# Patient Record
Sex: FEMALE | Race: WHITE | NOT HISPANIC OR LATINO | Employment: OTHER | URBAN - METROPOLITAN AREA
[De-identification: names, ages, dates, MRNs, and addresses within clinical notes are randomized per-mention and may not be internally consistent; named-entity substitution may affect disease eponyms.]

---

## 2017-10-16 ENCOUNTER — OFFICE VISIT (OUTPATIENT)
Dept: RHEUMATOLOGY | Facility: CLINIC | Age: 82
End: 2017-10-16
Payer: MEDICARE

## 2017-10-16 VITALS
WEIGHT: 137 LBS | DIASTOLIC BLOOD PRESSURE: 64 MMHG | HEIGHT: 64 IN | SYSTOLIC BLOOD PRESSURE: 150 MMHG | BODY MASS INDEX: 23.39 KG/M2

## 2017-10-16 DIAGNOSIS — M15.9 OSTEOARTHRITIS, GENERALIZED: ICD-10-CM

## 2017-10-16 DIAGNOSIS — M06.9 RHEUMATOID ARTHRITIS, INVOLVING UNSPECIFIED SITE, UNSPECIFIED RHEUMATOID FACTOR PRESENCE: Primary | ICD-10-CM

## 2017-10-16 PROCEDURE — 99213 OFFICE O/P EST LOW 20 MIN: CPT | Mod: ,,, | Performed by: INTERNAL MEDICINE

## 2017-10-16 RX ORDER — SIMVASTATIN 20 MG/1
20 TABLET, FILM COATED ORAL NIGHTLY
COMMUNITY

## 2017-10-16 RX ORDER — BENZONATATE 100 MG/1
CAPSULE ORAL
Refills: 0 | COMMUNITY
Start: 2017-08-27

## 2017-10-16 RX ORDER — LEVOTHYROXINE SODIUM 88 UG/1
88 TABLET ORAL DAILY
COMMUNITY

## 2017-10-16 RX ORDER — AMLODIPINE BESYLATE 5 MG/1
5 TABLET ORAL DAILY
COMMUNITY

## 2017-10-16 RX ORDER — METOPROLOL SUCCINATE 25 MG/1
25 TABLET, EXTENDED RELEASE ORAL DAILY
Refills: 3 | Status: ON HOLD | COMMUNITY
Start: 2017-09-18 | End: 2020-05-16 | Stop reason: HOSPADM

## 2017-10-16 RX ORDER — ALLOPURINOL 100 MG/1
100 TABLET ORAL DAILY
COMMUNITY

## 2017-10-16 RX ORDER — SILVER SULFADIAZINE 10 G/1000G
CREAM TOPICAL
Refills: 1 | COMMUNITY
Start: 2017-08-25 | End: 2020-05-11

## 2017-10-16 RX ORDER — BENAZEPRIL HYDROCHLORIDE AND HYDROCHLOROTHIAZIDE 20; 12.5 MG/1; MG/1
1 TABLET ORAL 2 TIMES DAILY
Refills: 3 | COMMUNITY
Start: 2017-08-04 | End: 2020-05-11

## 2017-10-16 RX ORDER — PREDNISONE 20 MG/1
TABLET ORAL
Refills: 0 | COMMUNITY
Start: 2017-08-27 | End: 2020-05-11

## 2017-10-16 NOTE — PROGRESS NOTES
[facility] RHEUMATOLOGY            PROGRESS NOTE      Subjective:       Patient ID:   NAME: Jillian Borrego : 3/5/1925     89 y.o. female    Referring Doc: No ref. provider found  Other Physicians:    Chief Complaint:  Rheumatoid Arthritis    History of Present Illness:     Patient returns today for a regularly scheduled follow-up visit for RA      The patient has been off Plaquenil  due to ophthalmologic side effects but has done well without it. No prolonged morning stiffness or joint swelling.  Chest pains cough or shortness of breasome          ROS:   GEN:  No  fever, night sweats . weight is stable   some fatigue  SKIN: no rashes, no bruising, no ulcerations, no Raynaud's  HEENT: no HA's, No visual changes, no mucosal ulcers, no sicca symptoms,  CV:   no CP, SOB, PND, TAVAREZ, no orthopnea, no palpitations  PULM: normal with no SOB, cough, hemoptysis, sputum or pleuritic pain  GI:  no abdominal pain, nausea, vomiting, constipation, diarrhea, melanotic stools, BRBPR, hematemesis, no dysphagia  :   no dysuria  NEURO: no paresthesias, headaches, visual disturbances, muscle weakness  MUSCULOSKELETAL:no joint swelling, prolonged AM stiffness, no back pain, no muscle pain  Allergies:  Review of patient's allergies indicates:   Allergen Reactions    Pcn [penicillins]        Medications:    Current Outpatient Prescriptions:     allopurinol (ZYLOPRIM) 100 MG tablet, Take by mouth., Disp: , Rfl:     amlodipine (NORVASC) 5 MG tablet, Take by mouth., Disp: , Rfl:     BENAZEPRIL HCL (LOTENSIN ORAL), Take by mouth., Disp: , Rfl:     benazepril-hydrochlorthiazide (LOTENSIN HCT) 20-12.5 mg per tablet, Take 1 tablet by mouth 2 (two) times daily., Disp: , Rfl: 3    benzonatate (TESSALON) 100 MG capsule, TAKE 1-2 CAPSULES BY MOUTH EVERY 8 HOURS AS NEEDED FOR COUGH, Disp: , Rfl: 0    FUROSEMIDE (LASIX ORAL), Take by mouth., Disp: , Rfl:     KLOR-CON SPRINKLE 10 mEq CpSR, , Disp: , Rfl:     levothyroxine (LEVOXYL) 88 MCG  "tablet, Take by mouth., Disp: , Rfl:     metoprolol succinate (TOPROL-XL) 25 MG 24 hr tablet, EVERY DAY, Disp: , Rfl: 3    predniSONE (DELTASONE) 20 MG tablet, TAKE 2 TABLETS BY MOUTH ONCE DAILY FOR 5 DAYS, Disp: , Rfl: 0    silver sulfADIAZINE 1% (SILVADENE) 1 % cream, APPLY TO THE AFFECTED AREA THREE TIMES A DAY, Disp: , Rfl: 1    simvastatin (ZOCOR) 20 MG tablet, Take by mouth., Disp: , Rfl:     WARFARIN SODIUM (COUMADIN ORAL), Take 6 mg by mouth. , Disp: , Rfl:     PMHx/PSHx Updates:  Objective:     Vitals:  Blood pressure (!) 150/64, height 5' 4" (1.626 m), weight 62.1 kg (137 lb).    Physical Examination:   GEN: no apparent distress, comfortable; AAOx3  SKIN: no rashes,no ulceration, no Raynaud's, no petechiae, no SQ nodules,  HEAD: normal  EYES: no pallor, no icterus,   NECK: no masses, thyroid normal, trachea midline, no LAD/LN's, supple  CV: RRR with no murmur; l S1 and S2 reg. ,no gallop no rubs,   CHEST: Normal respiratory effort; CTAB; normal breath sounds; no wheeze or crackles  MUSC/Skeletal: ROM normal; no crepitus; joints without synovitis,  no deformities  No joint swelling or tenderness of PIP, MCP, wrist, elbow, shoulder, or knee joints  EXTREM: no clubbing, cyanosis, +1 pitting  edema,normal  pulses   NEURO: grossly intact; motor WNL; AAOx  PSYCH: normal mood, affect and behavior  LYMPH: normal cervical, supraclavicular          Labs:   Lab Results   Component Value Date    WBC 9.18 01/04/2011    HGB 12.1 01/04/2011    HCT 34.8 (L) 01/04/2011    MCV 92.1 01/04/2011     01/04/2011    CMP  @LASTLAB(NA,K,CL,CO2,GLU,BUN,Creatinine,Calcium,PROT,Albumin,Bilitot,Alkphos,AST,ALT,CRP,ESR,RF,CCP,TERRENCE,SSA,CPK,uric acid) )@  I have reviewed all available lab results and radiology reports.    Radiology/Diagnostic Studies:        Assessment/Plan:   (1) 89 y.o. female with diagnosis of Rheumatoid arthritis. She has done well off Plaquenil. She is not on steroids and takes no pain medications.    We " will request results of recent blood test done by her nephrologist. Follow-up in 6 months or before if needed            Discussion:     I have explained all of the above in detail and the patient understands all of the current recommendation(s). I have answered all questions to the best of my ability and to their complete satisfaction.       The patient is to continue with the current management plan         RTC in   6 months      Electronically signed by Anitra Mcdonald MD

## 2018-04-26 ENCOUNTER — OUTSIDE PLACE OF SERVICE (OUTPATIENT)
Dept: ADMINISTRATIVE | Facility: OTHER | Age: 83
End: 2018-04-26
Payer: MEDICARE

## 2018-04-26 PROCEDURE — 99232 SBSQ HOSP IP/OBS MODERATE 35: CPT | Mod: ,,, | Performed by: THORACIC SURGERY (CARDIOTHORACIC VASCULAR SURGERY)

## 2018-04-28 ENCOUNTER — OUTSIDE PLACE OF SERVICE (OUTPATIENT)
Dept: ADMINISTRATIVE | Facility: OTHER | Age: 83
End: 2018-04-28
Payer: MEDICARE

## 2018-04-28 PROCEDURE — 99231 SBSQ HOSP IP/OBS SF/LOW 25: CPT | Mod: ,,, | Performed by: THORACIC SURGERY (CARDIOTHORACIC VASCULAR SURGERY)

## 2018-04-30 ENCOUNTER — OUTSIDE PLACE OF SERVICE (OUTPATIENT)
Dept: ADMINISTRATIVE | Facility: OTHER | Age: 83
End: 2018-04-30
Payer: MEDICARE

## 2018-04-30 PROCEDURE — 37224 PR FEM/POPL REVAS W/TLA: CPT | Mod: RT,,, | Performed by: THORACIC SURGERY (CARDIOTHORACIC VASCULAR SURGERY)

## 2018-04-30 PROCEDURE — 75710 ARTERY X-RAYS ARM/LEG: CPT | Mod: 26,59,, | Performed by: THORACIC SURGERY (CARDIOTHORACIC VASCULAR SURGERY)

## 2018-04-30 PROCEDURE — 99152 MOD SED SAME PHYS/QHP 5/>YRS: CPT | Mod: ,,, | Performed by: THORACIC SURGERY (CARDIOTHORACIC VASCULAR SURGERY)

## 2019-08-13 ENCOUNTER — LAB VISIT (OUTPATIENT)
Dept: LAB | Facility: HOSPITAL | Age: 84
End: 2019-08-13
Attending: INTERNAL MEDICINE
Payer: MEDICARE

## 2019-08-13 DIAGNOSIS — R60.9 EDEMA: ICD-10-CM

## 2019-08-13 DIAGNOSIS — N18.9 ANEMIA OF CHRONIC RENAL FAILURE: ICD-10-CM

## 2019-08-13 DIAGNOSIS — E87.6 HYPOPOTASSEMIA: ICD-10-CM

## 2019-08-13 DIAGNOSIS — E87.3 ALKALOSIS: Primary | ICD-10-CM

## 2019-08-13 DIAGNOSIS — D63.1 ANEMIA OF CHRONIC RENAL FAILURE: ICD-10-CM

## 2019-08-13 DIAGNOSIS — M10.9 GOUT, UNSPECIFIED: ICD-10-CM

## 2019-08-13 DIAGNOSIS — S81.802A WOUND OF LEFT LEG: ICD-10-CM

## 2019-08-13 DIAGNOSIS — N25.81 SECONDARY HYPERPARATHYROIDISM OF RENAL ORIGIN: ICD-10-CM

## 2019-08-13 DIAGNOSIS — I10 ESSENTIAL HYPERTENSION, MALIGNANT: ICD-10-CM

## 2019-08-13 DIAGNOSIS — M06.9 ATROPHIC ARTHRITIS: ICD-10-CM

## 2019-08-13 DIAGNOSIS — I48.91 ATRIAL FIBRILLATION: ICD-10-CM

## 2019-08-13 LAB
ALBUMIN SERPL BCP-MCNC: 3.6 G/DL (ref 3.5–5.2)
ANION GAP SERPL CALC-SCNC: 9 MMOL/L (ref 8–16)
BASOPHILS # BLD AUTO: 0.05 K/UL (ref 0–0.2)
BASOPHILS NFR BLD: 0.7 % (ref 0–1.9)
BILIRUB UR QL STRIP: NEGATIVE
BUN SERPL-MCNC: 32 MG/DL (ref 10–30)
CALCIUM SERPL-MCNC: 9.6 MG/DL (ref 8.7–10.5)
CHLORIDE SERPL-SCNC: 105 MMOL/L (ref 95–110)
CLARITY UR: CLEAR
CO2 SERPL-SCNC: 27 MMOL/L (ref 23–29)
COLOR UR: YELLOW
CREAT SERPL-MCNC: 1.4 MG/DL (ref 0.5–1.4)
CREAT UR-MCNC: 60 MG/DL (ref 15–325)
DIFFERENTIAL METHOD: ABNORMAL
EOSINOPHIL # BLD AUTO: 0.2 K/UL (ref 0–0.5)
EOSINOPHIL NFR BLD: 3.4 % (ref 0–8)
ERYTHROCYTE [DISTWIDTH] IN BLOOD BY AUTOMATED COUNT: 14.8 % (ref 11.5–14.5)
EST. GFR  (AFRICAN AMERICAN): 37.1 ML/MIN/1.73 M^2
EST. GFR  (NON AFRICAN AMERICAN): 32.2 ML/MIN/1.73 M^2
GLUCOSE SERPL-MCNC: 99 MG/DL (ref 70–110)
GLUCOSE UR QL STRIP: NEGATIVE
HCT VFR BLD AUTO: 33.5 % (ref 37–48.5)
HGB BLD-MCNC: 10.7 G/DL (ref 12–16)
HGB UR QL STRIP: NEGATIVE
IMM GRANULOCYTES # BLD AUTO: 0.04 K/UL (ref 0–0.04)
IMM GRANULOCYTES NFR BLD AUTO: 0.6 % (ref 0–0.5)
KETONES UR QL STRIP: NEGATIVE
LEUKOCYTE ESTERASE UR QL STRIP: NEGATIVE
LYMPHOCYTES # BLD AUTO: 0.9 K/UL (ref 1–4.8)
LYMPHOCYTES NFR BLD: 12.7 % (ref 18–48)
MAGNESIUM SERPL-MCNC: 2 MG/DL (ref 1.6–2.6)
MCH RBC QN AUTO: 32.4 PG (ref 27–31)
MCHC RBC AUTO-ENTMCNC: 31.9 G/DL (ref 32–36)
MCV RBC AUTO: 102 FL (ref 82–98)
MONOCYTES # BLD AUTO: 0.5 K/UL (ref 0.3–1)
MONOCYTES NFR BLD: 6.9 % (ref 4–15)
NEUTROPHILS # BLD AUTO: 5.1 K/UL (ref 1.8–7.7)
NEUTROPHILS NFR BLD: 75.7 % (ref 38–73)
NITRITE UR QL STRIP: NEGATIVE
NRBC BLD-RTO: 0 /100 WBC
PH UR STRIP: 7 [PH] (ref 5–8)
PHOSPHATE SERPL-MCNC: 3.6 MG/DL (ref 2.7–4.5)
PHOSPHATE SERPL-MCNC: 3.6 MG/DL (ref 2.7–4.5)
PLATELET # BLD AUTO: 130 K/UL (ref 150–350)
PMV BLD AUTO: 11 FL (ref 9.2–12.9)
POTASSIUM SERPL-SCNC: 4.1 MMOL/L (ref 3.5–5.1)
PROT UR QL STRIP: NEGATIVE
PROT UR-MCNC: 18 MG/DL (ref 0–15)
PROT/CREAT UR: 0.3 MG/G{CREAT} (ref 0–0.2)
PTH-INTACT SERPL-MCNC: 117.2 PG/ML (ref 9–77)
RBC # BLD AUTO: 3.3 M/UL (ref 4–5.4)
SODIUM SERPL-SCNC: 141 MMOL/L (ref 136–145)
SP GR UR STRIP: 1.01 (ref 1–1.03)
URN SPEC COLLECT METH UR: NORMAL
UROBILINOGEN UR STRIP-ACNC: NEGATIVE EU/DL
WBC # BLD AUTO: 6.71 K/UL (ref 3.9–12.7)

## 2019-08-13 PROCEDURE — 85025 COMPLETE CBC W/AUTO DIFF WBC: CPT

## 2019-08-13 PROCEDURE — 83735 ASSAY OF MAGNESIUM: CPT

## 2019-08-13 PROCEDURE — 36415 COLL VENOUS BLD VENIPUNCTURE: CPT

## 2019-08-13 PROCEDURE — 83970 ASSAY OF PARATHORMONE: CPT

## 2019-08-13 PROCEDURE — 80069 RENAL FUNCTION PANEL: CPT

## 2019-08-13 PROCEDURE — 81003 URINALYSIS AUTO W/O SCOPE: CPT

## 2019-08-13 PROCEDURE — 84156 ASSAY OF PROTEIN URINE: CPT

## 2019-09-06 ENCOUNTER — LAB VISIT (OUTPATIENT)
Dept: LAB | Facility: HOSPITAL | Age: 84
End: 2019-09-06
Attending: INTERNAL MEDICINE
Payer: MEDICARE

## 2019-09-06 DIAGNOSIS — I25.10 CORONARY ATHEROSCLEROSIS OF NATIVE CORONARY ARTERY: ICD-10-CM

## 2019-09-06 DIAGNOSIS — I51.9 MYXEDEMA HEART DISEASE: Primary | ICD-10-CM

## 2019-09-06 DIAGNOSIS — E03.9 MYXEDEMA HEART DISEASE: Primary | ICD-10-CM

## 2019-09-06 DIAGNOSIS — I48.0 PAROXYSMAL ATRIAL FIBRILLATION: ICD-10-CM

## 2019-09-06 DIAGNOSIS — I50.9 HEART FAILURE, UNSPECIFIED: ICD-10-CM

## 2019-09-06 LAB
ALBUMIN SERPL BCP-MCNC: 3.8 G/DL (ref 3.5–5.2)
ALP SERPL-CCNC: 60 U/L (ref 55–135)
ALT SERPL W/O P-5'-P-CCNC: 14 U/L (ref 10–44)
ANION GAP SERPL CALC-SCNC: 10 MMOL/L (ref 8–16)
AST SERPL-CCNC: 21 U/L (ref 10–40)
BASOPHILS # BLD AUTO: 0.04 K/UL (ref 0–0.2)
BASOPHILS NFR BLD: 0.7 % (ref 0–1.9)
BILIRUB SERPL-MCNC: 0.9 MG/DL (ref 0.1–1)
BNP SERPL-MCNC: 546 PG/ML (ref 0–99)
BUN SERPL-MCNC: 28 MG/DL (ref 10–30)
CALCIUM SERPL-MCNC: 9.3 MG/DL (ref 8.7–10.5)
CHLORIDE SERPL-SCNC: 105 MMOL/L (ref 95–110)
CO2 SERPL-SCNC: 28 MMOL/L (ref 23–29)
CREAT SERPL-MCNC: 1.1 MG/DL (ref 0.5–1.4)
DIFFERENTIAL METHOD: ABNORMAL
EOSINOPHIL # BLD AUTO: 0.1 K/UL (ref 0–0.5)
EOSINOPHIL NFR BLD: 2 % (ref 0–8)
ERYTHROCYTE [DISTWIDTH] IN BLOOD BY AUTOMATED COUNT: 16.3 % (ref 11.5–14.5)
EST. GFR  (AFRICAN AMERICAN): 49.7 ML/MIN/1.73 M^2
EST. GFR  (NON AFRICAN AMERICAN): 43.1 ML/MIN/1.73 M^2
GLUCOSE SERPL-MCNC: 102 MG/DL (ref 70–110)
HCT VFR BLD AUTO: 31.6 % (ref 37–48.5)
HGB BLD-MCNC: 10.1 G/DL (ref 12–16)
IMM GRANULOCYTES # BLD AUTO: 0.04 K/UL (ref 0–0.04)
IMM GRANULOCYTES NFR BLD AUTO: 0.7 % (ref 0–0.5)
LYMPHOCYTES # BLD AUTO: 0.6 K/UL (ref 1–4.8)
LYMPHOCYTES NFR BLD: 9 % (ref 18–48)
MCH RBC QN AUTO: 33.4 PG (ref 27–31)
MCHC RBC AUTO-ENTMCNC: 32 G/DL (ref 32–36)
MCV RBC AUTO: 105 FL (ref 82–98)
MONOCYTES # BLD AUTO: 0.6 K/UL (ref 0.3–1)
MONOCYTES NFR BLD: 9.4 % (ref 4–15)
NEUTROPHILS # BLD AUTO: 4.8 K/UL (ref 1.8–7.7)
NEUTROPHILS NFR BLD: 78.2 % (ref 38–73)
NRBC BLD-RTO: 0 /100 WBC
PLATELET # BLD AUTO: 92 K/UL (ref 150–350)
PMV BLD AUTO: 11.7 FL (ref 9.2–12.9)
POTASSIUM SERPL-SCNC: 3.8 MMOL/L (ref 3.5–5.1)
PROT SERPL-MCNC: 6.8 G/DL (ref 6–8.4)
RBC # BLD AUTO: 3.02 M/UL (ref 4–5.4)
SODIUM SERPL-SCNC: 143 MMOL/L (ref 136–145)
WBC # BLD AUTO: 6.08 K/UL (ref 3.9–12.7)

## 2019-09-06 PROCEDURE — 80053 COMPREHEN METABOLIC PANEL: CPT

## 2019-09-06 PROCEDURE — 83880 ASSAY OF NATRIURETIC PEPTIDE: CPT

## 2019-09-06 PROCEDURE — 36415 COLL VENOUS BLD VENIPUNCTURE: CPT

## 2019-09-06 PROCEDURE — 85025 COMPLETE CBC W/AUTO DIFF WBC: CPT

## 2019-11-04 ENCOUNTER — LAB VISIT (OUTPATIENT)
Dept: LAB | Facility: HOSPITAL | Age: 84
End: 2019-11-04
Attending: INTERNAL MEDICINE
Payer: MEDICARE

## 2019-11-04 DIAGNOSIS — I48.0 PAROXYSMAL ATRIAL FIBRILLATION: Primary | ICD-10-CM

## 2019-11-04 DIAGNOSIS — Z79.01 LONG TERM (CURRENT) USE OF ANTICOAGULANTS: ICD-10-CM

## 2019-11-04 LAB
INR PPP: 3
PROTHROMBIN TIME: 30.5 SEC (ref 10.6–14.8)

## 2019-11-04 PROCEDURE — 36415 COLL VENOUS BLD VENIPUNCTURE: CPT

## 2019-11-04 PROCEDURE — 85610 PROTHROMBIN TIME: CPT

## 2019-11-08 ENCOUNTER — LAB VISIT (OUTPATIENT)
Dept: LAB | Facility: HOSPITAL | Age: 84
End: 2019-11-08
Attending: INTERNAL MEDICINE
Payer: MEDICARE

## 2019-11-08 DIAGNOSIS — D64.9 ANEMIA, UNSPECIFIED: Primary | ICD-10-CM

## 2019-11-08 DIAGNOSIS — I48.0 PAROXYSMAL ATRIAL FIBRILLATION: ICD-10-CM

## 2019-11-08 DIAGNOSIS — I50.9 HEART FAILURE, UNSPECIFIED: ICD-10-CM

## 2019-11-08 DIAGNOSIS — N18.9 CHRONIC KIDNEY DISEASE, UNSPECIFIED: ICD-10-CM

## 2019-11-08 LAB
ANION GAP SERPL CALC-SCNC: 10 MMOL/L (ref 8–16)
BASOPHILS # BLD AUTO: 0.03 K/UL (ref 0–0.2)
BASOPHILS NFR BLD: 0.5 % (ref 0–1.9)
BNP SERPL-MCNC: 643 PG/ML (ref 0–99)
BUN SERPL-MCNC: 42 MG/DL (ref 10–30)
CALCIUM SERPL-MCNC: 10.1 MG/DL (ref 8.7–10.5)
CHLORIDE SERPL-SCNC: 99 MMOL/L (ref 95–110)
CO2 SERPL-SCNC: 29 MMOL/L (ref 23–29)
CREAT SERPL-MCNC: 1.6 MG/DL (ref 0.5–1.4)
DIFFERENTIAL METHOD: ABNORMAL
EOSINOPHIL # BLD AUTO: 0.2 K/UL (ref 0–0.5)
EOSINOPHIL NFR BLD: 2.8 % (ref 0–8)
ERYTHROCYTE [DISTWIDTH] IN BLOOD BY AUTOMATED COUNT: 14.3 % (ref 11.5–14.5)
EST. GFR  (AFRICAN AMERICAN): 31.6 ML/MIN/1.73 M^2
EST. GFR  (NON AFRICAN AMERICAN): 27.4 ML/MIN/1.73 M^2
GLUCOSE SERPL-MCNC: 94 MG/DL (ref 70–110)
HCT VFR BLD AUTO: 36.6 % (ref 37–48.5)
HGB BLD-MCNC: 11.8 G/DL (ref 12–16)
IMM GRANULOCYTES # BLD AUTO: 0.04 K/UL (ref 0–0.04)
IMM GRANULOCYTES NFR BLD AUTO: 0.6 % (ref 0–0.5)
LYMPHOCYTES # BLD AUTO: 1 K/UL (ref 1–4.8)
LYMPHOCYTES NFR BLD: 14.9 % (ref 18–48)
MCH RBC QN AUTO: 33.1 PG (ref 27–31)
MCHC RBC AUTO-ENTMCNC: 32.2 G/DL (ref 32–36)
MCV RBC AUTO: 103 FL (ref 82–98)
MONOCYTES # BLD AUTO: 0.6 K/UL (ref 0.3–1)
MONOCYTES NFR BLD: 9.7 % (ref 4–15)
NEUTROPHILS # BLD AUTO: 4.7 K/UL (ref 1.8–7.7)
NEUTROPHILS NFR BLD: 71.5 % (ref 38–73)
NRBC BLD-RTO: 0 /100 WBC
PLATELET # BLD AUTO: 76 K/UL (ref 150–350)
PMV BLD AUTO: 12.6 FL (ref 9.2–12.9)
POTASSIUM SERPL-SCNC: 4.1 MMOL/L (ref 3.5–5.1)
RBC # BLD AUTO: 3.57 M/UL (ref 4–5.4)
SODIUM SERPL-SCNC: 138 MMOL/L (ref 136–145)
WBC # BLD AUTO: 6.5 K/UL (ref 3.9–12.7)

## 2019-11-08 PROCEDURE — 85025 COMPLETE CBC W/AUTO DIFF WBC: CPT

## 2019-11-08 PROCEDURE — 36415 COLL VENOUS BLD VENIPUNCTURE: CPT

## 2019-11-08 PROCEDURE — 80048 BASIC METABOLIC PNL TOTAL CA: CPT

## 2019-11-08 PROCEDURE — 83880 ASSAY OF NATRIURETIC PEPTIDE: CPT

## 2019-11-18 ENCOUNTER — LAB VISIT (OUTPATIENT)
Dept: LAB | Facility: HOSPITAL | Age: 84
End: 2019-11-18
Attending: INTERNAL MEDICINE
Payer: MEDICARE

## 2019-11-18 DIAGNOSIS — S81.802A WOUND OF LEFT LEG: ICD-10-CM

## 2019-11-18 DIAGNOSIS — I48.91 ATRIAL FIBRILLATION: ICD-10-CM

## 2019-11-18 DIAGNOSIS — M10.9 GOUT, UNSPECIFIED: ICD-10-CM

## 2019-11-18 DIAGNOSIS — N18.30 CHRONIC KIDNEY DISEASE, STAGE III (MODERATE): ICD-10-CM

## 2019-11-18 DIAGNOSIS — E87.6 HYPOPOTASSEMIA: ICD-10-CM

## 2019-11-18 DIAGNOSIS — M06.9 ATROPHIC ARTHRITIS: ICD-10-CM

## 2019-11-18 DIAGNOSIS — Z79.01 LONG TERM (CURRENT) USE OF ANTICOAGULANTS: ICD-10-CM

## 2019-11-18 DIAGNOSIS — D63.1 ANEMIA OF CHRONIC RENAL FAILURE: ICD-10-CM

## 2019-11-18 DIAGNOSIS — N25.81 SECONDARY HYPERPARATHYROIDISM OF RENAL ORIGIN: ICD-10-CM

## 2019-11-18 DIAGNOSIS — I48.0 PAROXYSMAL ATRIAL FIBRILLATION: ICD-10-CM

## 2019-11-18 DIAGNOSIS — E87.3 ALKALOSIS: Primary | ICD-10-CM

## 2019-11-18 DIAGNOSIS — R60.9 EDEMA: ICD-10-CM

## 2019-11-18 DIAGNOSIS — I10 ESSENTIAL HYPERTENSION, MALIGNANT: ICD-10-CM

## 2019-11-18 DIAGNOSIS — N18.9 ANEMIA OF CHRONIC RENAL FAILURE: ICD-10-CM

## 2019-11-18 LAB
ALBUMIN SERPL BCP-MCNC: 4 G/DL (ref 3.5–5.2)
ANION GAP SERPL CALC-SCNC: 4 MMOL/L (ref 8–16)
BASOPHILS # BLD AUTO: 0.04 K/UL (ref 0–0.2)
BASOPHILS NFR BLD: 0.7 % (ref 0–1.9)
BILIRUB UR QL STRIP: NEGATIVE
BUN SERPL-MCNC: 42 MG/DL (ref 10–30)
CALCIUM SERPL-MCNC: 10 MG/DL (ref 8.7–10.5)
CHLORIDE SERPL-SCNC: 105 MMOL/L (ref 95–110)
CLARITY UR: CLEAR
CO2 SERPL-SCNC: 28 MMOL/L (ref 23–29)
COLOR UR: COLORLESS
CREAT SERPL-MCNC: 1.6 MG/DL (ref 0.5–1.4)
CREAT UR-MCNC: 24 MG/DL (ref 15–325)
DIFFERENTIAL METHOD: ABNORMAL
EOSINOPHIL # BLD AUTO: 0.2 K/UL (ref 0–0.5)
EOSINOPHIL NFR BLD: 3.2 % (ref 0–8)
ERYTHROCYTE [DISTWIDTH] IN BLOOD BY AUTOMATED COUNT: 14.6 % (ref 11.5–14.5)
EST. GFR  (AFRICAN AMERICAN): 31.6 ML/MIN/1.73 M^2
EST. GFR  (NON AFRICAN AMERICAN): 27.4 ML/MIN/1.73 M^2
GLUCOSE SERPL-MCNC: 91 MG/DL (ref 70–110)
GLUCOSE UR QL STRIP: NEGATIVE
HCT VFR BLD AUTO: 34.7 % (ref 37–48.5)
HGB BLD-MCNC: 11.4 G/DL (ref 12–16)
HGB UR QL STRIP: NEGATIVE
IMM GRANULOCYTES # BLD AUTO: 0.03 K/UL (ref 0–0.04)
IMM GRANULOCYTES NFR BLD AUTO: 0.5 % (ref 0–0.5)
INR PPP: 2.9
KETONES UR QL STRIP: NEGATIVE
LEUKOCYTE ESTERASE UR QL STRIP: NEGATIVE
LYMPHOCYTES # BLD AUTO: 0.7 K/UL (ref 1–4.8)
LYMPHOCYTES NFR BLD: 12.7 % (ref 18–48)
MAGNESIUM SERPL-MCNC: 2.2 MG/DL (ref 1.6–2.6)
MCH RBC QN AUTO: 33.2 PG (ref 27–31)
MCHC RBC AUTO-ENTMCNC: 32.9 G/DL (ref 32–36)
MCV RBC AUTO: 101 FL (ref 82–98)
MONOCYTES # BLD AUTO: 0.5 K/UL (ref 0.3–1)
MONOCYTES NFR BLD: 9.2 % (ref 4–15)
NEUTROPHILS # BLD AUTO: 4.2 K/UL (ref 1.8–7.7)
NEUTROPHILS NFR BLD: 73.7 % (ref 38–73)
NITRITE UR QL STRIP: NEGATIVE
NRBC BLD-RTO: 0 /100 WBC
PH UR STRIP: 6 [PH] (ref 5–8)
PHOSPHATE SERPL-MCNC: 3.6 MG/DL (ref 2.7–4.5)
PHOSPHATE SERPL-MCNC: 3.6 MG/DL (ref 2.7–4.5)
PLATELET # BLD AUTO: 79 K/UL (ref 150–350)
PMV BLD AUTO: 11.7 FL (ref 9.2–12.9)
POTASSIUM SERPL-SCNC: 4.2 MMOL/L (ref 3.5–5.1)
PROT UR QL STRIP: NEGATIVE
PROT UR-MCNC: <6 MG/DL (ref 0–15)
PROT/CREAT UR: NORMAL MG/G{CREAT} (ref 0–0.2)
PROTHROMBIN TIME: 29.6 SEC (ref 10.6–14.8)
PTH-INTACT SERPL-MCNC: 115.8 PG/ML (ref 9–77)
RBC # BLD AUTO: 3.43 M/UL (ref 4–5.4)
SODIUM SERPL-SCNC: 137 MMOL/L (ref 136–145)
SP GR UR STRIP: 1 (ref 1–1.03)
URATE SERPL-MCNC: 6 MG/DL (ref 2.4–5.7)
URN SPEC COLLECT METH UR: ABNORMAL
UROBILINOGEN UR STRIP-ACNC: NEGATIVE EU/DL
WBC # BLD AUTO: 5.68 K/UL (ref 3.9–12.7)

## 2019-11-18 PROCEDURE — 84156 ASSAY OF PROTEIN URINE: CPT

## 2019-11-18 PROCEDURE — 83735 ASSAY OF MAGNESIUM: CPT

## 2019-11-18 PROCEDURE — 84550 ASSAY OF BLOOD/URIC ACID: CPT

## 2019-11-18 PROCEDURE — 83970 ASSAY OF PARATHORMONE: CPT

## 2019-11-18 PROCEDURE — 85610 PROTHROMBIN TIME: CPT

## 2019-11-18 PROCEDURE — 36415 COLL VENOUS BLD VENIPUNCTURE: CPT

## 2019-11-18 PROCEDURE — 80069 RENAL FUNCTION PANEL: CPT

## 2019-11-18 PROCEDURE — 81003 URINALYSIS AUTO W/O SCOPE: CPT

## 2019-11-18 PROCEDURE — 85025 COMPLETE CBC W/AUTO DIFF WBC: CPT

## 2019-12-02 ENCOUNTER — HOSPITAL ENCOUNTER (OUTPATIENT)
Dept: RADIOLOGY | Facility: HOSPITAL | Age: 84
Discharge: HOME | End: 2019-12-02
Attending: INTERNAL MEDICINE
Payer: MEDICARE

## 2019-12-02 DIAGNOSIS — R06.02 SHORTNESS OF BREATH: Primary | ICD-10-CM

## 2019-12-02 DIAGNOSIS — R06.02 SHORTNESS OF BREATH: ICD-10-CM

## 2019-12-02 PROCEDURE — 71046 X-RAY EXAM CHEST 2 VIEWS: CPT | Mod: TC,PO

## 2020-01-02 ENCOUNTER — LAB VISIT (OUTPATIENT)
Dept: LAB | Facility: HOSPITAL | Age: 85
End: 2020-01-02
Attending: INTERNAL MEDICINE
Payer: MEDICARE

## 2020-01-02 DIAGNOSIS — Z79.01 LONG TERM (CURRENT) USE OF ANTICOAGULANTS: ICD-10-CM

## 2020-01-02 DIAGNOSIS — I48.0 PAROXYSMAL ATRIAL FIBRILLATION: Primary | ICD-10-CM

## 2020-01-02 LAB
ANION GAP SERPL CALC-SCNC: 12 MMOL/L (ref 8–16)
BASOPHILS # BLD AUTO: 0.04 K/UL (ref 0–0.2)
BASOPHILS NFR BLD: 0.5 % (ref 0–1.9)
BNP SERPL-MCNC: 679 PG/ML (ref 0–99)
BUN SERPL-MCNC: 40 MG/DL (ref 10–30)
CALCIUM SERPL-MCNC: 9.9 MG/DL (ref 8.7–10.5)
CHLORIDE SERPL-SCNC: 103 MMOL/L (ref 95–110)
CO2 SERPL-SCNC: 27 MMOL/L (ref 23–29)
CREAT SERPL-MCNC: 1.7 MG/DL (ref 0.5–1.4)
DIFFERENTIAL METHOD: ABNORMAL
EOSINOPHIL # BLD AUTO: 0.3 K/UL (ref 0–0.5)
EOSINOPHIL NFR BLD: 3.2 % (ref 0–8)
ERYTHROCYTE [DISTWIDTH] IN BLOOD BY AUTOMATED COUNT: 15.2 % (ref 11.5–14.5)
EST. GFR  (AFRICAN AMERICAN): 29.3 ML/MIN/1.73 M^2
EST. GFR  (NON AFRICAN AMERICAN): 25.5 ML/MIN/1.73 M^2
GLUCOSE SERPL-MCNC: 96 MG/DL (ref 70–110)
HCT VFR BLD AUTO: 33.5 % (ref 37–48.5)
HGB BLD-MCNC: 10.9 G/DL (ref 12–16)
IMM GRANULOCYTES # BLD AUTO: 0.05 K/UL (ref 0–0.04)
IMM GRANULOCYTES NFR BLD AUTO: 0.6 % (ref 0–0.5)
INR PPP: 2.8
LYMPHOCYTES # BLD AUTO: 1.1 K/UL (ref 1–4.8)
LYMPHOCYTES NFR BLD: 13.2 % (ref 18–48)
MCH RBC QN AUTO: 34 PG (ref 27–31)
MCHC RBC AUTO-ENTMCNC: 32.5 G/DL (ref 32–36)
MCV RBC AUTO: 104 FL (ref 82–98)
MONOCYTES # BLD AUTO: 0.7 K/UL (ref 0.3–1)
MONOCYTES NFR BLD: 8.4 % (ref 4–15)
NEUTROPHILS # BLD AUTO: 6.3 K/UL (ref 1.8–7.7)
NEUTROPHILS NFR BLD: 74.1 % (ref 38–73)
NRBC BLD-RTO: 0 /100 WBC
PLATELET # BLD AUTO: 119 K/UL (ref 150–350)
PMV BLD AUTO: 11.8 FL (ref 9.2–12.9)
POTASSIUM SERPL-SCNC: 4.2 MMOL/L (ref 3.5–5.1)
PROTHROMBIN TIME: 28.8 SEC (ref 10.6–14.8)
RBC # BLD AUTO: 3.21 M/UL (ref 4–5.4)
SODIUM SERPL-SCNC: 142 MMOL/L (ref 136–145)
WBC # BLD AUTO: 8.53 K/UL (ref 3.9–12.7)

## 2020-01-02 PROCEDURE — 80048 BASIC METABOLIC PNL TOTAL CA: CPT

## 2020-01-02 PROCEDURE — 85025 COMPLETE CBC W/AUTO DIFF WBC: CPT

## 2020-01-02 PROCEDURE — 36415 COLL VENOUS BLD VENIPUNCTURE: CPT

## 2020-01-02 PROCEDURE — 85610 PROTHROMBIN TIME: CPT

## 2020-01-02 PROCEDURE — 83880 ASSAY OF NATRIURETIC PEPTIDE: CPT

## 2020-02-18 ENCOUNTER — LAB VISIT (OUTPATIENT)
Dept: LAB | Facility: HOSPITAL | Age: 85
End: 2020-02-18
Attending: INTERNAL MEDICINE
Payer: MEDICARE

## 2020-02-18 DIAGNOSIS — N17.9 ACUTE KIDNEY FAILURE, UNSPECIFIED: Primary | ICD-10-CM

## 2020-02-18 DIAGNOSIS — I48.0 PAROXYSMAL ATRIAL FIBRILLATION: ICD-10-CM

## 2020-02-18 DIAGNOSIS — Z79.01 LONG TERM (CURRENT) USE OF ANTICOAGULANTS: ICD-10-CM

## 2020-02-18 LAB
ALBUMIN SERPL BCP-MCNC: 3.8 G/DL (ref 3.5–5.2)
ANION GAP SERPL CALC-SCNC: 9 MMOL/L (ref 8–16)
BILIRUB UR QL STRIP: NEGATIVE
BUN SERPL-MCNC: 51 MG/DL (ref 10–30)
CALCIUM SERPL-MCNC: 9.7 MG/DL (ref 8.7–10.5)
CHLORIDE SERPL-SCNC: 104 MMOL/L (ref 95–110)
CLARITY UR: CLEAR
CO2 SERPL-SCNC: 26 MMOL/L (ref 23–29)
COLOR UR: YELLOW
CREAT SERPL-MCNC: 2 MG/DL (ref 0.5–1.4)
EST. GFR  (AFRICAN AMERICAN): 24.1 ML/MIN/1.73 M^2
EST. GFR  (NON AFRICAN AMERICAN): 20.9 ML/MIN/1.73 M^2
GLUCOSE SERPL-MCNC: 98 MG/DL (ref 70–110)
GLUCOSE UR QL STRIP: NEGATIVE
HGB UR QL STRIP: NEGATIVE
INR PPP: 3.4
KETONES UR QL STRIP: NEGATIVE
LEUKOCYTE ESTERASE UR QL STRIP: NEGATIVE
NITRITE UR QL STRIP: NEGATIVE
PH UR STRIP: 7 [PH] (ref 5–8)
PHOSPHATE SERPL-MCNC: 4.5 MG/DL (ref 2.7–4.5)
POTASSIUM SERPL-SCNC: 4.5 MMOL/L (ref 3.5–5.1)
PROT UR QL STRIP: NEGATIVE
PROTHROMBIN TIME: 32.8 SEC (ref 10.6–14.8)
SODIUM SERPL-SCNC: 139 MMOL/L (ref 136–145)
SP GR UR STRIP: 1.01 (ref 1–1.03)
URN SPEC COLLECT METH UR: NORMAL
UROBILINOGEN UR STRIP-ACNC: NEGATIVE EU/DL

## 2020-02-18 PROCEDURE — 85610 PROTHROMBIN TIME: CPT

## 2020-02-18 PROCEDURE — 81003 URINALYSIS AUTO W/O SCOPE: CPT

## 2020-02-18 PROCEDURE — 36415 COLL VENOUS BLD VENIPUNCTURE: CPT

## 2020-02-18 PROCEDURE — 80069 RENAL FUNCTION PANEL: CPT

## 2020-02-26 ENCOUNTER — LAB VISIT (OUTPATIENT)
Dept: LAB | Facility: HOSPITAL | Age: 85
End: 2020-02-26
Attending: INTERNAL MEDICINE
Payer: MEDICARE

## 2020-02-26 DIAGNOSIS — I48.0 PAROXYSMAL ATRIAL FIBRILLATION: Primary | ICD-10-CM

## 2020-02-26 DIAGNOSIS — Z79.01 LONG TERM (CURRENT) USE OF ANTICOAGULANTS: ICD-10-CM

## 2020-02-26 LAB
INR PPP: 2.5
PROTHROMBIN TIME: 26.2 SEC (ref 10.6–14.8)

## 2020-02-26 PROCEDURE — 36415 COLL VENOUS BLD VENIPUNCTURE: CPT

## 2020-02-26 PROCEDURE — 85610 PROTHROMBIN TIME: CPT

## 2020-03-09 ENCOUNTER — LAB VISIT (OUTPATIENT)
Dept: LAB | Facility: HOSPITAL | Age: 85
End: 2020-03-09
Attending: INTERNAL MEDICINE
Payer: MEDICARE

## 2020-03-09 DIAGNOSIS — E03.9 MYXEDEMA HEART DISEASE: ICD-10-CM

## 2020-03-09 DIAGNOSIS — I50.9 HEART FAILURE, UNSPECIFIED: Primary | ICD-10-CM

## 2020-03-09 DIAGNOSIS — I51.9 MYXEDEMA HEART DISEASE: ICD-10-CM

## 2020-03-09 DIAGNOSIS — Z79.01 LONG TERM (CURRENT) USE OF ANTICOAGULANTS: ICD-10-CM

## 2020-03-09 DIAGNOSIS — I48.0 PAROXYSMAL ATRIAL FIBRILLATION: ICD-10-CM

## 2020-03-09 DIAGNOSIS — I50.32 CHRONIC DIASTOLIC HEART FAILURE: ICD-10-CM

## 2020-03-09 DIAGNOSIS — E87.6 HYPOPOTASSEMIA: ICD-10-CM

## 2020-03-09 LAB
ANION GAP SERPL CALC-SCNC: 10 MMOL/L (ref 8–16)
BASOPHILS # BLD AUTO: 0.06 K/UL (ref 0–0.2)
BASOPHILS NFR BLD: 0.8 % (ref 0–1.9)
BNP SERPL-MCNC: 687 PG/ML (ref 0–99)
BUN SERPL-MCNC: 40 MG/DL (ref 10–30)
CALCIUM SERPL-MCNC: 9.7 MG/DL (ref 8.7–10.5)
CHLORIDE SERPL-SCNC: 104 MMOL/L (ref 95–110)
CO2 SERPL-SCNC: 24 MMOL/L (ref 23–29)
CREAT SERPL-MCNC: 1.9 MG/DL (ref 0.5–1.4)
DIFFERENTIAL METHOD: ABNORMAL
EOSINOPHIL # BLD AUTO: 0.3 K/UL (ref 0–0.5)
EOSINOPHIL NFR BLD: 4.3 % (ref 0–8)
ERYTHROCYTE [DISTWIDTH] IN BLOOD BY AUTOMATED COUNT: 15.7 % (ref 11.5–14.5)
EST. GFR  (AFRICAN AMERICAN): 25.5 ML/MIN/1.73 M^2
EST. GFR  (NON AFRICAN AMERICAN): 22.1 ML/MIN/1.73 M^2
GLUCOSE SERPL-MCNC: 98 MG/DL (ref 70–110)
HCT VFR BLD AUTO: 28.2 % (ref 37–48.5)
HGB BLD-MCNC: 9.2 G/DL (ref 12–16)
IMM GRANULOCYTES # BLD AUTO: 0.05 K/UL (ref 0–0.04)
IMM GRANULOCYTES NFR BLD AUTO: 0.7 % (ref 0–0.5)
INR PPP: 3.5
LYMPHOCYTES # BLD AUTO: 0.7 K/UL (ref 1–4.8)
LYMPHOCYTES NFR BLD: 9.8 % (ref 18–48)
MCH RBC QN AUTO: 34.5 PG (ref 27–31)
MCHC RBC AUTO-ENTMCNC: 32.6 G/DL (ref 32–36)
MCV RBC AUTO: 106 FL (ref 82–98)
MONOCYTES # BLD AUTO: 0.5 K/UL (ref 0.3–1)
MONOCYTES NFR BLD: 7.5 % (ref 4–15)
NEUTROPHILS # BLD AUTO: 5.6 K/UL (ref 1.8–7.7)
NEUTROPHILS NFR BLD: 76.9 % (ref 38–73)
NRBC BLD-RTO: 0 /100 WBC
PLATELET # BLD AUTO: 109 K/UL (ref 150–350)
PMV BLD AUTO: 11.7 FL (ref 9.2–12.9)
POTASSIUM SERPL-SCNC: 4.6 MMOL/L (ref 3.5–5.1)
PROTHROMBIN TIME: 34.1 SEC (ref 10.6–14.8)
RBC # BLD AUTO: 2.67 M/UL (ref 4–5.4)
SODIUM SERPL-SCNC: 138 MMOL/L (ref 136–145)
WBC # BLD AUTO: 7.23 K/UL (ref 3.9–12.7)

## 2020-03-09 PROCEDURE — 85025 COMPLETE CBC W/AUTO DIFF WBC: CPT

## 2020-03-09 PROCEDURE — 85610 PROTHROMBIN TIME: CPT

## 2020-03-09 PROCEDURE — 83880 ASSAY OF NATRIURETIC PEPTIDE: CPT

## 2020-03-09 PROCEDURE — 36415 COLL VENOUS BLD VENIPUNCTURE: CPT

## 2020-03-09 PROCEDURE — 80048 BASIC METABOLIC PNL TOTAL CA: CPT

## 2020-05-11 ENCOUNTER — HOSPITAL ENCOUNTER (INPATIENT)
Facility: HOSPITAL | Age: 85
LOS: 5 days | Discharge: HOME HEALTH | DRG: 291 | End: 2020-05-16
Attending: EMERGENCY MEDICINE | Admitting: FAMILY MEDICINE
Payer: MEDICARE

## 2020-05-11 DIAGNOSIS — I48.91 A-FIB: ICD-10-CM

## 2020-05-11 DIAGNOSIS — I50.9 CONGESTIVE HEART FAILURE, UNSPECIFIED HF CHRONICITY, UNSPECIFIED HEART FAILURE TYPE: Primary | ICD-10-CM

## 2020-05-11 DIAGNOSIS — R07.9 CHEST PAIN: ICD-10-CM

## 2020-05-11 DIAGNOSIS — R06.02 SOB (SHORTNESS OF BREATH): ICD-10-CM

## 2020-05-11 DIAGNOSIS — I50.9 ACUTE HEART FAILURE: ICD-10-CM

## 2020-05-11 DIAGNOSIS — J96.01 ACUTE HYPOXEMIC RESPIRATORY FAILURE: ICD-10-CM

## 2020-05-11 PROBLEM — R79.1 SUPRATHERAPEUTIC INR: Status: ACTIVE | Noted: 2020-05-11

## 2020-05-11 PROBLEM — D69.6 THROMBOCYTOPENIA: Status: ACTIVE | Noted: 2020-05-11

## 2020-05-11 PROBLEM — E03.9 HYPOTHYROIDISM: Chronic | Status: ACTIVE | Noted: 2020-05-11

## 2020-05-11 PROBLEM — J44.1 COPD EXACERBATION: Status: ACTIVE | Noted: 2020-05-11

## 2020-05-11 PROBLEM — I48.21 PERMANENT ATRIAL FIBRILLATION: Chronic | Status: ACTIVE | Noted: 2020-05-11

## 2020-05-11 PROBLEM — E78.5 HYPERLIPIDEMIA: Chronic | Status: ACTIVE | Noted: 2020-05-11

## 2020-05-11 PROBLEM — Z66 DNR (DO NOT RESUSCITATE): Chronic | Status: ACTIVE | Noted: 2020-05-11

## 2020-05-11 PROBLEM — N18.9 CKD (CHRONIC KIDNEY DISEASE): Chronic | Status: ACTIVE | Noted: 2020-05-11

## 2020-05-11 PROBLEM — J44.9 COPD (CHRONIC OBSTRUCTIVE PULMONARY DISEASE): Chronic | Status: ACTIVE | Noted: 2020-05-11

## 2020-05-11 PROBLEM — I10 HYPERTENSION: Chronic | Status: ACTIVE | Noted: 2020-05-11

## 2020-05-11 PROBLEM — R54 ADVANCED AGE: Chronic | Status: ACTIVE | Noted: 2020-05-11

## 2020-05-11 PROBLEM — D64.9 SYMPTOMATIC ANEMIA: Status: ACTIVE | Noted: 2020-05-11

## 2020-05-11 LAB
25(OH)D3+25(OH)D2 SERPL-MCNC: 26 NG/ML (ref 30–96)
ABO + RH BLD: NORMAL
ALBUMIN SERPL BCP-MCNC: 3.7 G/DL (ref 3.5–5.2)
ALLENS TEST: ABNORMAL
ALLENS TEST: ABNORMAL
ALP SERPL-CCNC: 65 U/L (ref 55–135)
ALT SERPL W/O P-5'-P-CCNC: 16 U/L (ref 10–44)
ANION GAP SERPL CALC-SCNC: 7 MMOL/L (ref 8–16)
AST SERPL-CCNC: 25 U/L (ref 10–40)
BACTERIA #/AREA URNS HPF: ABNORMAL /HPF
BASOPHILS # BLD AUTO: 0.04 K/UL (ref 0–0.2)
BASOPHILS NFR BLD: 0.4 % (ref 0–1.9)
BILIRUB SERPL-MCNC: 1.2 MG/DL (ref 0.1–1)
BILIRUB UR QL STRIP: NEGATIVE
BLD GP AB SCN CELLS X3 SERPL QL: NORMAL
BNP SERPL-MCNC: 1222 PG/ML (ref 0–99)
BNP SERPL-MCNC: 892 PG/ML (ref 0–99)
BNP SERPL-MCNC: 892 PG/ML (ref 0–99)
BUN SERPL-MCNC: 37 MG/DL (ref 10–30)
CALCIUM SERPL-MCNC: 9.5 MG/DL (ref 8.7–10.5)
CHLORIDE SERPL-SCNC: 109 MMOL/L (ref 95–110)
CLARITY UR: ABNORMAL
CO2 SERPL-SCNC: 26 MMOL/L (ref 23–29)
COLOR UR: YELLOW
CREAT SERPL-MCNC: 1.5 MG/DL (ref 0.5–1.4)
DELSYS: ABNORMAL
DELSYS: ABNORMAL
DIFFERENTIAL METHOD: ABNORMAL
EOSINOPHIL # BLD AUTO: 0 K/UL (ref 0–0.5)
EOSINOPHIL NFR BLD: 0 % (ref 0–8)
EP: 5
EP: 5
ERYTHROCYTE [DISTWIDTH] IN BLOOD BY AUTOMATED COUNT: 15 % (ref 11.5–14.5)
ERYTHROCYTE [SEDIMENTATION RATE] IN BLOOD BY WESTERGREN METHOD: 16 MM/H
ERYTHROCYTE [SEDIMENTATION RATE] IN BLOOD BY WESTERGREN METHOD: 16 MM/H
EST. GFR  (AFRICAN AMERICAN): 33.9 ML/MIN/1.73 M^2
EST. GFR  (NON AFRICAN AMERICAN): 29.4 ML/MIN/1.73 M^2
FERRITIN SERPL-MCNC: 83 NG/ML (ref 20–300)
FIO2: 0.5
FIO2: 30
FOLATE SERPL-MCNC: >24.8 NG/ML (ref 4–24)
GLUCOSE SERPL-MCNC: 119 MG/DL (ref 70–110)
GLUCOSE UR QL STRIP: NEGATIVE
HCO3 UR-SCNC: 25.3 MMOL/L (ref 24–28)
HCO3 UR-SCNC: 26.4 MMOL/L (ref 24–28)
HCT VFR BLD AUTO: 28.4 % (ref 37–48.5)
HCT VFR BLD AUTO: 29.3 % (ref 37–48.5)
HGB BLD-MCNC: 8.8 G/DL (ref 12–16)
HGB BLD-MCNC: 9.2 G/DL (ref 12–16)
HGB UR QL STRIP: NEGATIVE
HYALINE CASTS #/AREA URNS LPF: 35 /LPF
IMM GRANULOCYTES # BLD AUTO: 0.03 K/UL (ref 0–0.04)
IMM GRANULOCYTES NFR BLD AUTO: 0.3 % (ref 0–0.5)
INR PPP: 5
INR PPP: 5.3
IP: 10
IP: 10
IRON SERPL-MCNC: 35 UG/DL (ref 30–160)
KETONES UR QL STRIP: NEGATIVE
LACTATE SERPL-SCNC: 1.5 MMOL/L (ref 0.5–1.9)
LEUKOCYTE ESTERASE UR QL STRIP: ABNORMAL
LYMPHOCYTES # BLD AUTO: 0.4 K/UL (ref 1–4.8)
LYMPHOCYTES NFR BLD: 3.9 % (ref 18–48)
MAGNESIUM SERPL-MCNC: 2.3 MG/DL (ref 1.6–2.6)
MCH RBC QN AUTO: 32.5 PG (ref 27–31)
MCHC RBC AUTO-ENTMCNC: 31 G/DL (ref 32–36)
MCV RBC AUTO: 105 FL (ref 82–98)
MICROSCOPIC COMMENT: ABNORMAL
MODE: ABNORMAL
MODE: ABNORMAL
MONOCYTES # BLD AUTO: 0.4 K/UL (ref 0.3–1)
MONOCYTES NFR BLD: 4.1 % (ref 4–15)
NEUTROPHILS # BLD AUTO: 8.3 K/UL (ref 1.8–7.7)
NEUTROPHILS NFR BLD: 91.3 % (ref 38–73)
NITRITE UR QL STRIP: NEGATIVE
NRBC BLD-RTO: 0 /100 WBC
PCO2 BLDA: 37.6 MMHG (ref 35–45)
PCO2 BLDA: 48.2 MMHG (ref 35–45)
PH SMN: 7.35 [PH] (ref 7.35–7.45)
PH SMN: 7.44 [PH] (ref 7.35–7.45)
PH UR STRIP: 6 [PH] (ref 5–8)
PHOSPHATE SERPL-MCNC: 4 MG/DL (ref 2.7–4.5)
PLATELET # BLD AUTO: 81 K/UL (ref 150–350)
PMV BLD AUTO: 12.7 FL (ref 9.2–12.9)
PO2 BLDA: 187 MMHG (ref 80–100)
PO2 BLDA: 64 MMHG (ref 80–100)
POC BE: 1 MMOL/L
POC BE: 1 MMOL/L
POC SATURATED O2: 100 % (ref 95–100)
POC SATURATED O2: 93 % (ref 95–100)
POC TCO2: 26 MMOL/L (ref 23–27)
POC TCO2: 28 MMOL/L (ref 23–27)
POTASSIUM SERPL-SCNC: 4.4 MMOL/L (ref 3.5–5.1)
PROCALCITONIN SERPL IA-MCNC: 0.07 NG/ML (ref 0–0.5)
PROCALCITONIN SERPL IA-MCNC: <0.05 NG/ML (ref 0–0.5)
PROT SERPL-MCNC: 6.8 G/DL (ref 6–8.4)
PROT UR QL STRIP: ABNORMAL
PROTHROMBIN TIME: 44.8 SEC (ref 10.6–14.8)
PROTHROMBIN TIME: 46.9 SEC (ref 10.6–14.8)
PTH-INTACT SERPL-MCNC: 126.9 PG/ML (ref 9–77)
RBC # BLD AUTO: 2.71 M/UL (ref 4–5.4)
RBC #/AREA URNS HPF: 1 /HPF (ref 0–4)
RETICS/RBC NFR AUTO: 4.1 % (ref 0.5–2.5)
SAMPLE: ABNORMAL
SAMPLE: ABNORMAL
SARS-COV-2 RDRP RESP QL NAA+PROBE: NEGATIVE
SATURATED IRON: 8 % (ref 20–50)
SITE: ABNORMAL
SITE: ABNORMAL
SODIUM SERPL-SCNC: 142 MMOL/L (ref 136–145)
SP GR UR STRIP: 1.02 (ref 1–1.03)
SP02: 93
SPONT RATE: 28
SQUAMOUS #/AREA URNS HPF: 0 /HPF
TOTAL IRON BINDING CAPACITY: 441 UG/DL (ref 250–450)
TRANSFERRIN SERPL-MCNC: 315 MG/DL (ref 200–375)
TROPONIN I SERPL DL<=0.01 NG/ML-MCNC: 0.36 NG/ML
TROPONIN I SERPL DL<=0.01 NG/ML-MCNC: 1.13 NG/ML
URN SPEC COLLECT METH UR: ABNORMAL
UROBILINOGEN UR STRIP-ACNC: NEGATIVE EU/DL
VIT B12 SERPL-MCNC: 450 PG/ML (ref 210–950)
WBC # BLD AUTO: 9.06 K/UL (ref 3.9–12.7)
WBC #/AREA URNS HPF: 30 /HPF (ref 0–5)

## 2020-05-11 PROCEDURE — 85014 HEMATOCRIT: CPT

## 2020-05-11 PROCEDURE — 51701 INSERT BLADDER CATHETER: CPT

## 2020-05-11 PROCEDURE — 80053 COMPREHEN METABOLIC PANEL: CPT

## 2020-05-11 PROCEDURE — 82607 VITAMIN B-12: CPT

## 2020-05-11 PROCEDURE — 83880 ASSAY OF NATRIURETIC PEPTIDE: CPT | Mod: 91

## 2020-05-11 PROCEDURE — 27000221 HC OXYGEN, UP TO 24 HOURS

## 2020-05-11 PROCEDURE — 63600175 PHARM REV CODE 636 W HCPCS: Performed by: FAMILY MEDICINE

## 2020-05-11 PROCEDURE — 20000000 HC ICU ROOM

## 2020-05-11 PROCEDURE — 85610 PROTHROMBIN TIME: CPT | Mod: 91

## 2020-05-11 PROCEDURE — 96374 THER/PROPH/DIAG INJ IV PUSH: CPT

## 2020-05-11 PROCEDURE — 84484 ASSAY OF TROPONIN QUANT: CPT

## 2020-05-11 PROCEDURE — 85610 PROTHROMBIN TIME: CPT

## 2020-05-11 PROCEDURE — 85045 AUTOMATED RETICULOCYTE COUNT: CPT

## 2020-05-11 PROCEDURE — 63600175 PHARM REV CODE 636 W HCPCS: Performed by: EMERGENCY MEDICINE

## 2020-05-11 PROCEDURE — 36415 COLL VENOUS BLD VENIPUNCTURE: CPT

## 2020-05-11 PROCEDURE — 82728 ASSAY OF FERRITIN: CPT

## 2020-05-11 PROCEDURE — 83880 ASSAY OF NATRIURETIC PEPTIDE: CPT

## 2020-05-11 PROCEDURE — 94640 AIRWAY INHALATION TREATMENT: CPT

## 2020-05-11 PROCEDURE — 83540 ASSAY OF IRON: CPT

## 2020-05-11 PROCEDURE — 94660 CPAP INITIATION&MGMT: CPT

## 2020-05-11 PROCEDURE — 81001 URINALYSIS AUTO W/SCOPE: CPT

## 2020-05-11 PROCEDURE — 83605 ASSAY OF LACTIC ACID: CPT

## 2020-05-11 PROCEDURE — 94761 N-INVAS EAR/PLS OXIMETRY MLT: CPT

## 2020-05-11 PROCEDURE — 82306 VITAMIN D 25 HYDROXY: CPT

## 2020-05-11 PROCEDURE — 85025 COMPLETE CBC W/AUTO DIFF WBC: CPT

## 2020-05-11 PROCEDURE — 25000003 PHARM REV CODE 250: Performed by: FAMILY MEDICINE

## 2020-05-11 PROCEDURE — 85018 HEMOGLOBIN: CPT

## 2020-05-11 PROCEDURE — 25000242 PHARM REV CODE 250 ALT 637 W/ HCPCS: Performed by: FAMILY MEDICINE

## 2020-05-11 PROCEDURE — 84484 ASSAY OF TROPONIN QUANT: CPT | Mod: 91

## 2020-05-11 PROCEDURE — 36600 WITHDRAWAL OF ARTERIAL BLOOD: CPT

## 2020-05-11 PROCEDURE — 99900035 HC TECH TIME PER 15 MIN (STAT)

## 2020-05-11 PROCEDURE — 83970 ASSAY OF PARATHORMONE: CPT

## 2020-05-11 PROCEDURE — 93005 ELECTROCARDIOGRAM TRACING: CPT | Performed by: INTERNAL MEDICINE

## 2020-05-11 PROCEDURE — U0002 COVID-19 LAB TEST NON-CDC: HCPCS

## 2020-05-11 PROCEDURE — 84100 ASSAY OF PHOSPHORUS: CPT

## 2020-05-11 PROCEDURE — 86850 RBC ANTIBODY SCREEN: CPT

## 2020-05-11 PROCEDURE — 84145 PROCALCITONIN (PCT): CPT

## 2020-05-11 PROCEDURE — 84145 PROCALCITONIN (PCT): CPT | Mod: 91

## 2020-05-11 PROCEDURE — 82746 ASSAY OF FOLIC ACID SERUM: CPT

## 2020-05-11 PROCEDURE — 99285 EMERGENCY DEPT VISIT HI MDM: CPT | Mod: 25

## 2020-05-11 PROCEDURE — 86920 COMPATIBILITY TEST SPIN: CPT

## 2020-05-11 PROCEDURE — 82803 BLOOD GASES ANY COMBINATION: CPT

## 2020-05-11 PROCEDURE — 83735 ASSAY OF MAGNESIUM: CPT

## 2020-05-11 PROCEDURE — 87040 BLOOD CULTURE FOR BACTERIA: CPT

## 2020-05-11 RX ORDER — IPRATROPIUM BROMIDE AND ALBUTEROL SULFATE 2.5; .5 MG/3ML; MG/3ML
3 SOLUTION RESPIRATORY (INHALATION) EVERY 6 HOURS PRN
Status: DISCONTINUED | OUTPATIENT
Start: 2020-05-11 | End: 2020-05-16 | Stop reason: HOSPADM

## 2020-05-11 RX ORDER — LEVOTHYROXINE SODIUM 88 UG/1
88 TABLET ORAL DAILY
Status: DISCONTINUED | OUTPATIENT
Start: 2020-05-12 | End: 2020-05-16 | Stop reason: HOSPADM

## 2020-05-11 RX ORDER — TALC
6 POWDER (GRAM) TOPICAL NIGHTLY PRN
Status: DISCONTINUED | OUTPATIENT
Start: 2020-05-11 | End: 2020-05-16 | Stop reason: HOSPADM

## 2020-05-11 RX ORDER — CALCIUM CHLORIDE IN 0.9 % NACL 1 G/100 ML
1 INTRAVENOUS SOLUTION, PIGGYBACK (ML) INTRAVENOUS
Status: DISCONTINUED | OUTPATIENT
Start: 2020-05-11 | End: 2020-05-16 | Stop reason: HOSPADM

## 2020-05-11 RX ORDER — ACETAMINOPHEN 325 MG/1
650 TABLET ORAL EVERY 8 HOURS PRN
Status: DISCONTINUED | OUTPATIENT
Start: 2020-05-11 | End: 2020-05-16 | Stop reason: HOSPADM

## 2020-05-11 RX ORDER — FLUTICASONE FUROATE AND VILANTEROL 100; 25 UG/1; UG/1
1 POWDER RESPIRATORY (INHALATION) DAILY
Status: DISCONTINUED | OUTPATIENT
Start: 2020-05-11 | End: 2020-05-16 | Stop reason: HOSPADM

## 2020-05-11 RX ORDER — IPRATROPIUM BROMIDE AND ALBUTEROL SULFATE 2.5; .5 MG/3ML; MG/3ML
3 SOLUTION RESPIRATORY (INHALATION)
Status: DISCONTINUED | OUTPATIENT
Start: 2020-05-11 | End: 2020-05-12 | Stop reason: SDUPTHER

## 2020-05-11 RX ORDER — SPIRONOLACTONE 25 MG/1
25 TABLET ORAL DAILY
Status: DISCONTINUED | OUTPATIENT
Start: 2020-05-12 | End: 2020-05-16 | Stop reason: HOSPADM

## 2020-05-11 RX ORDER — MAGNESIUM SULFATE HEPTAHYDRATE 40 MG/ML
2 INJECTION, SOLUTION INTRAVENOUS
Status: DISCONTINUED | OUTPATIENT
Start: 2020-05-11 | End: 2020-05-16 | Stop reason: HOSPADM

## 2020-05-11 RX ORDER — WARFARIN SODIUM 5 MG/1
5 TABLET ORAL
Status: ON HOLD | COMMUNITY
End: 2020-05-16 | Stop reason: HOSPADM

## 2020-05-11 RX ORDER — FUROSEMIDE 10 MG/ML
40 INJECTION INTRAMUSCULAR; INTRAVENOUS DAILY
Status: DISCONTINUED | OUTPATIENT
Start: 2020-05-12 | End: 2020-05-16 | Stop reason: HOSPADM

## 2020-05-11 RX ORDER — POTASSIUM CHLORIDE 20 MEQ/1
20 TABLET, EXTENDED RELEASE ORAL
Status: DISCONTINUED | OUTPATIENT
Start: 2020-05-11 | End: 2020-05-16 | Stop reason: HOSPADM

## 2020-05-11 RX ORDER — SODIUM CHLORIDE 0.9 % (FLUSH) 0.9 %
2 SYRINGE (ML) INJECTION
Status: DISCONTINUED | OUTPATIENT
Start: 2020-05-11 | End: 2020-05-16 | Stop reason: HOSPADM

## 2020-05-11 RX ORDER — POTASSIUM CHLORIDE 20 MEQ/1
40 TABLET, EXTENDED RELEASE ORAL
Status: DISCONTINUED | OUTPATIENT
Start: 2020-05-11 | End: 2020-05-16 | Stop reason: HOSPADM

## 2020-05-11 RX ORDER — ASPIRIN 325 MG
325 TABLET, DELAYED RELEASE (ENTERIC COATED) ORAL ONCE
Status: DISCONTINUED | OUTPATIENT
Start: 2020-05-11 | End: 2020-05-11

## 2020-05-11 RX ORDER — POTASSIUM CHLORIDE 7.45 MG/ML
20 INJECTION INTRAVENOUS
Status: DISCONTINUED | OUTPATIENT
Start: 2020-05-11 | End: 2020-05-16 | Stop reason: HOSPADM

## 2020-05-11 RX ORDER — NAPROXEN SODIUM 220 MG/1
81 TABLET, FILM COATED ORAL DAILY
Status: DISCONTINUED | OUTPATIENT
Start: 2020-05-12 | End: 2020-05-11

## 2020-05-11 RX ORDER — POTASSIUM CHLORIDE 7.45 MG/ML
40 INJECTION INTRAVENOUS
Status: DISCONTINUED | OUTPATIENT
Start: 2020-05-11 | End: 2020-05-16 | Stop reason: HOSPADM

## 2020-05-11 RX ORDER — FLUTICASONE FUROATE, UMECLIDINIUM BROMIDE AND VILANTEROL TRIFENATATE 100; 62.5; 25 UG/1; UG/1; UG/1
1 POWDER RESPIRATORY (INHALATION)
COMMUNITY
Start: 2020-03-25

## 2020-05-11 RX ORDER — MAGNESIUM SULFATE 1 G/100ML
1 INJECTION INTRAVENOUS
Status: DISCONTINUED | OUTPATIENT
Start: 2020-05-11 | End: 2020-05-16 | Stop reason: HOSPADM

## 2020-05-11 RX ORDER — MULTIVITAMIN
1 TABLET ORAL DAILY
COMMUNITY

## 2020-05-11 RX ORDER — ONDANSETRON 2 MG/ML
4 INJECTION INTRAMUSCULAR; INTRAVENOUS EVERY 8 HOURS PRN
Status: DISCONTINUED | OUTPATIENT
Start: 2020-05-11 | End: 2020-05-16 | Stop reason: HOSPADM

## 2020-05-11 RX ORDER — POLYETHYLENE GLYCOL 3350 17 G/17G
17 POWDER, FOR SOLUTION ORAL DAILY PRN
Status: DISCONTINUED | OUTPATIENT
Start: 2020-05-11 | End: 2020-05-16 | Stop reason: HOSPADM

## 2020-05-11 RX ORDER — ASPIRIN 325 MG
325 TABLET, DELAYED RELEASE (ENTERIC COATED) ORAL ONCE
Status: DISCONTINUED | OUTPATIENT
Start: 2020-05-11 | End: 2020-05-15

## 2020-05-11 RX ORDER — SPIRONOLACTONE 25 MG/1
25 TABLET ORAL DAILY
COMMUNITY
Start: 2020-05-08

## 2020-05-11 RX ORDER — FUROSEMIDE 10 MG/ML
40 INJECTION INTRAMUSCULAR; INTRAVENOUS
Status: COMPLETED | OUTPATIENT
Start: 2020-05-11 | End: 2020-05-11

## 2020-05-11 RX ORDER — SIMVASTATIN 20 MG/1
20 TABLET, FILM COATED ORAL NIGHTLY
Status: DISCONTINUED | OUTPATIENT
Start: 2020-05-11 | End: 2020-05-16 | Stop reason: HOSPADM

## 2020-05-11 RX ORDER — ALLOPURINOL 100 MG/1
100 TABLET ORAL DAILY
Status: DISCONTINUED | OUTPATIENT
Start: 2020-05-12 | End: 2020-05-16 | Stop reason: HOSPADM

## 2020-05-11 RX ORDER — MAGNESIUM SULFATE HEPTAHYDRATE 40 MG/ML
4 INJECTION, SOLUTION INTRAVENOUS
Status: DISCONTINUED | OUTPATIENT
Start: 2020-05-11 | End: 2020-05-16 | Stop reason: HOSPADM

## 2020-05-11 RX ORDER — METOPROLOL SUCCINATE 25 MG/1
25 TABLET, EXTENDED RELEASE ORAL DAILY
Status: DISCONTINUED | OUTPATIENT
Start: 2020-05-12 | End: 2020-05-15

## 2020-05-11 RX ORDER — IPRATROPIUM BROMIDE AND ALBUTEROL SULFATE 2.5; .5 MG/3ML; MG/3ML
3 SOLUTION RESPIRATORY (INHALATION) EVERY 6 HOURS PRN
COMMUNITY

## 2020-05-11 RX ORDER — CALCITRIOL 0.25 UG/1
0.25 CAPSULE ORAL DAILY
COMMUNITY
Start: 2020-02-29

## 2020-05-11 RX ORDER — LANOLIN ALCOHOL/MO/W.PET/CERES
800 CREAM (GRAM) TOPICAL
Status: DISCONTINUED | OUTPATIENT
Start: 2020-05-11 | End: 2020-05-16 | Stop reason: HOSPADM

## 2020-05-11 RX ORDER — AMLODIPINE BESYLATE 5 MG/1
5 TABLET ORAL DAILY
Status: DISCONTINUED | OUTPATIENT
Start: 2020-05-12 | End: 2020-05-16 | Stop reason: HOSPADM

## 2020-05-11 RX ORDER — ACETAMINOPHEN 325 MG/1
650 TABLET ORAL EVERY 4 HOURS PRN
Status: DISCONTINUED | OUTPATIENT
Start: 2020-05-11 | End: 2020-05-16 | Stop reason: HOSPADM

## 2020-05-11 RX ADMIN — METHYLPREDNISOLONE SODIUM SUCCINATE 40 MG: 40 INJECTION, POWDER, FOR SOLUTION INTRAMUSCULAR; INTRAVENOUS at 08:05

## 2020-05-11 RX ADMIN — FUROSEMIDE 40 MG: 10 INJECTION, SOLUTION INTRAMUSCULAR; INTRAVENOUS at 01:05

## 2020-05-11 RX ADMIN — CEFTRIAXONE 1 G: 1 INJECTION, SOLUTION INTRAVENOUS at 05:05

## 2020-05-11 RX ADMIN — IPRATROPIUM BROMIDE AND ALBUTEROL SULFATE 3 ML: .5; 3 SOLUTION RESPIRATORY (INHALATION) at 03:05

## 2020-05-11 RX ADMIN — SIMVASTATIN 20 MG: 20 TABLET, FILM COATED ORAL at 09:05

## 2020-05-11 RX ADMIN — IPRATROPIUM BROMIDE AND ALBUTEROL SULFATE 3 ML: .5; 3 SOLUTION RESPIRATORY (INHALATION) at 07:05

## 2020-05-11 NOTE — ASSESSMENT & PLAN NOTE
Plan:  Likely multifactorial with acute heart failure and COPD exacerbation, possibly also symptomatic anemia from GI bleed with supratherapeutic INR  ICU  Lasix IV, Strict I/O, daily weights  Echo pending    Elevated troponin likely demand ischemia, NSTEMI type 2  Trending troponins  ASA    Bronchodilators, steroids  Procalcitonin pending    Supratherapeutic INR with suspected GI bleed  Baseline Hb 12-13 (2019)  Iron studies, B12, folate    Suspected CKD  Baseline Cr 1.3  Vitamin-D, PTH    Urine cultures, blood cultures pending  IV Rocephin  Continue home medications.  Holding warfarin due to possible symptomatic anemia/GI bleed    Consult cardiology  Consult GI  Appreciate consultants    DNR

## 2020-05-11 NOTE — H&P
[facility] Medicine  History & Physical    Patient Name: Jillian Borrego  MRN: [id number]  Admission Date: 5/11/2020  Attending Physician: Von Adair MD   Primary Care Provider: Primary Doctor No         Patient information was obtained from patient, past medical records and ER records.     Subjective:     Principal Problem:Acute hypoxemic respiratory failure    Chief Complaint:   Chief Complaint   Patient presents with    Shortness of Breath     fall this morning        HPI: HPI per patient and per ER records  89-year-old female with COPD, chronic heart failure, hyperlipidemia, hypothyroidism, chronic atrial fibrillation on warfarin, CKD, advanced age, DNR comes in for shortness of breath and fall.  Per ER records, patient had at accidental trip and fell and struck her head.  EMS was called and patient appeared short of breath however patient refused to come to the ED.  Patient continued to have shortness of breath during the day and EMS was subsequently called again and brought patient to the ED for further evaluation.  During my interview, patient is short of breath and tachypneic saturating 84% on 5 L nasal cannula.  Able to speak and wanted to worsen sepsis.  Reports living home alone.  Her son and daughter come see her regularly.  Patient was then placed on BiPAP.  Denies fever, chills, chest pain.  Denies hemoptysis, hematuria, melena, hematochezia, gross bleeding.    In the ED, RR 30s, 84% on 5 L, H&H 8.8/28.4 (Baseline 12-13 in 2019), INR 5.3, Cr 1.5 (BL 1.3), , troponin 0.361,  UA with leukocytes and many bacteria, head CT unremarkable, chest x-ray concern for acute heart failure.    Past Medical History:   Diagnosis Date    Afib     Hx of stroke without residual deficits     Hypothyroid     Rheumatoid arthritis        Past Surgical History:   Procedure Laterality Date    RIGHT OOPHORECTOMY      ROTATOR CUFF REPAIR Right        Review of patient's allergies  indicates:   Allergen Reactions    Pcn [penicillins]        No current facility-administered medications on file prior to encounter.      Current Outpatient Medications on File Prior to Encounter   Medication Sig    albuterol-ipratropium (DUO-NEB) 2.5 mg-0.5 mg/3 mL nebulizer solution Take 3 mLs by nebulization every 6 (six) hours as needed for Wheezing. Rescue    allopurinol (ZYLOPRIM) 100 MG tablet Take 100 mg by mouth once daily.     amlodipine (NORVASC) 5 MG tablet Take 5 mg by mouth once daily.     calcitRIOL (ROCALTROL) 0.25 MCG Cap Take 0.25 mcg by mouth once daily.     FUROSEMIDE (LASIX ORAL) Take 40 mg by mouth once daily.     levothyroxine (LEVOXYL) 88 MCG tablet Take 88 mcg by mouth once daily.     metoprolol succinate (TOPROL-XL) 25 MG 24 hr tablet Take 25 mg by mouth once daily.     multivitamin (THERAGRAN) per tablet Take 1 tablet by mouth once daily.    simvastatin (ZOCOR) 20 MG tablet Take 20 mg by mouth every evening.     spironolactone (ALDACTONE) 25 MG tablet Take 25 mg by mouth once daily.     TRELEGY ELLIPTA 100-62.5-25 mcg DsDv Inhale 1 puff into the lungs every 4 to 6 hours as needed.     warfarin (COUMADIN) 5 MG tablet Take 5 mg by mouth every Tuesday, Thursday, Saturday, Sunday.    benzonatate (TESSALON) 100 MG capsule TAKE 1-2 CAPSULES BY MOUTH EVERY 8 HOURS AS NEEDED FOR COUGH    WARFARIN SODIUM (COUMADIN ORAL) Take 6 mg by mouth every Mon, Wed, Fri.     [DISCONTINUED] BENAZEPRIL HCL (LOTENSIN ORAL) Take by mouth.    [DISCONTINUED] benazepril-hydrochlorthiazide (LOTENSIN HCT) 20-12.5 mg per tablet Take 1 tablet by mouth 2 (two) times daily.    [DISCONTINUED] KLOR-CON SPRINKLE 10 mEq CpSR     [DISCONTINUED] predniSONE (DELTASONE) 20 MG tablet TAKE 2 TABLETS BY MOUTH ONCE DAILY FOR 5 DAYS    [DISCONTINUED] silver sulfADIAZINE 1% (SILVADENE) 1 % cream APPLY TO THE AFFECTED AREA THREE TIMES A DAY     Family History     Problem Relation (Age of Onset)    Heart disease  Mother, Father        Tobacco Use    Smoking status: Former Smoker     Packs/day: 1.00     Years: 10.00     Pack years: 10.00     Types: Cigarettes     Last attempt to quit: 1980     Years since quittin.3    Smokeless tobacco: Never Used   Substance and Sexual Activity    Alcohol use: No    Drug use: No    Sexual activity: Not on file     Review of Systems   Unable to perform ROS: Acuity of condition     Objective:     Vital Signs (Most Recent):  Temp: 97.5 °F (36.4 °C) (20 1158)  Pulse: 97 (20 1403)  Resp: (!) 28 (20 1330)  BP: (!) 151/93 (20 1403)  SpO2: 95 % (20 1403) Vital Signs (24h Range):  Temp:  [97.5 °F (36.4 °C)] 97.5 °F (36.4 °C)  Pulse:  [78-97] 97  Resp:  [27-36] 28  SpO2:  [90 %-100 %] 95 %  BP: (151-178)/(67-93) 151/93     Weight: 62 kg (136 lb 11 oz)  Body mass index is 23.46 kg/m².    Physical Exam   Constitutional: She appears well-developed and well-nourished. She appears distressed.   Frail appearing, 1-2 words sentences   HENT:   Head: Normocephalic and atraumatic.   Right Ear: External ear normal.   Left Ear: External ear normal.   Nose: Nose normal.   Mouth/Throat: Oropharynx is clear and moist. No oropharyngeal exudate.   Eyes: Pupils are equal, round, and reactive to light. Conjunctivae and EOM are normal. Right eye exhibits no discharge. Left eye exhibits no discharge. No scleral icterus.   Neck: Normal range of motion. Neck supple. No thyromegaly present.   Cardiovascular: Normal rate, normal heart sounds and intact distal pulses. Exam reveals no gallop and no friction rub.   No murmur heard.  IRR, no edema   Pulmonary/Chest: She is in respiratory distress. She exhibits no tenderness.   Crackles in BL lower lungs, mild end expiratory wheezing in all lung fields, diminished breath sounds BL   Abdominal: Soft. Bowel sounds are normal. She exhibits no distension and no mass. There is no tenderness. There is no rebound and no guarding. No hernia.    Musculoskeletal: Normal range of motion. She exhibits no edema or tenderness.   Lymphadenopathy:     She has no cervical adenopathy.   Neurological: She is alert.   Skin: Skin is warm. She is not diaphoretic.   Nursing note and vitals reviewed.        CRANIAL NERVES     CN III, IV, VI   Pupils are equal, round, and reactive to light.  Extraocular motions are normal.        Significant Labs:   ABGs:   Recent Labs   Lab 05/11/20  1517   PH 7.348*   PCO2 48.2*   HCO3 26.4   POCSATURATED 100   BE 1     CBC:   Recent Labs   Lab 05/11/20  1220   WBC 9.06   HGB 8.8*   HCT 28.4*   PLT 81*     CMP:   Recent Labs   Lab 05/11/20  1220      K 4.4      CO2 26   *   BUN 37*   CREATININE 1.5*   CALCIUM 9.5   PROT 6.8   ALBUMIN 3.7   BILITOT 1.2*   ALKPHOS 65   AST 25   ALT 16   ANIONGAP 7*   EGFRNONAA 29.4*     Cardiac Markers:   Recent Labs   Lab 05/11/20  1220   *  892*     Coagulation:   Recent Labs   Lab 05/11/20  1220   INR 5.3*     Troponin:   Recent Labs   Lab 05/11/20  1220   TROPONINI 0.361*     Urine Studies:   Recent Labs   Lab 05/11/20  1332   COLORU Yellow   APPEARANCEUA Hazy*   PHUR 6.0   SPECGRAV 1.020   PROTEINUA 1+*   GLUCUA Negative   KETONESU Negative   BILIRUBINUA Negative   OCCULTUA Negative   NITRITE Negative   UROBILINOGEN Negative   LEUKOCYTESUR 2+*   RBCUA 1   WBCUA 30*   BACTERIA Many*   SQUAMEPITHEL 0   HYALINECASTS 35*     Recent Lab Results       05/11/20  1517   05/11/20  1332   05/11/20  1313   05/11/20  1258   05/11/20  1220        Albumin         3.7     Alkaline Phosphatase         65     Allens Test Pass             ALT         16     Anion Gap         7     Appearance, UA   Hazy           AST         25     Bacteria, UA   Many           Baso #         0.04     Basophil%         0.4     Bilirubin (UA)   Negative           BILIRUBIN TOTAL         1.2  Comment:  For infants and newborns, interpretation of results should be based  on gestational age, weight and in  agreement with clinical  observations.  Premature Infant recommended reference ranges:  Up to 24 hours.............<8.0 mg/dL  Up to 48 hours............<12.0 mg/dL  3-5 days..................<15.0 mg/dL  6-29 days.................<15.0 mg/dL       BNP         892  Comment:  Values of less than 100 pg/ml are consistent with non-CHF populations.              892  Comment:  Values of less than 100 pg/ml are consistent with non-CHF populations.     Site RR             BUN, Bld         37     Calcium         9.5     Chloride         109     CO2         26     Color, UA   Yellow           Creatinine         1.5     DelSys CPAP/BiPAP             Differential Method         Automated     eGFR if          33.9     eGFR if non          29.4  Comment:  Calculation used to obtain the estimated glomerular filtration  rate (eGFR) is the CKD-EPI equation.        Eos #         0.0     Eosinophil%         0.0     EP 5             FiO2 0.50             Glucose         119     Glucose, UA   Negative           Gran # (ANC)         8.3     Gran%         91.3     Hematocrit         28.4     Hemoglobin         8.8     Hyaline Casts, UA   35           Immature Grans (Abs)         0.03  Comment:  Mild elevation in immature granulocytes is non specific and   can be seen in a variety of conditions including stress response,   acute inflammation, trauma and pregnancy. Correlation with other   laboratory and clinical findings is essential.       Immature Granulocytes         0.3     INR         5.3  Comment:  Coumadin Therapy:  INR: 2.0-3.0 conventional anticoagulation  INR: 2.5-3.5 intensive anticoagulation  INR critical result(s) called and verbal readback obtained from Lindsey Martinez RN ED by FF1 05/11/2020 14:13       IP 10             Ketones, UA   Negative           Lactate, Elian     1.5  Comment:  Falsely low lactic acid results can be found in samples   containing >=13.0 mg/dL total bilirubin and/or  >=3.5 mg/dL   direct bilirubin.           Leukocytes, UA   2+           Lymph #         0.4     Lymph%         3.9     Magnesium         2.3     MCH         32.5     MCHC         31.0     MCV         105     Microscopic Comment   SEE COMMENT  Comment:  Other formed elements not mentioned in the report are not   present in the microscopic examination.              Mode BiPAP             Mono #         0.4     Mono%         4.1     MPV         12.7     NITRITE UA   Negative           nRBC         0     Occult Blood UA   Negative           pH, UA   6.0           Phosphorus         4.0     Platelets         81     POC BE 1             POC HCO3 26.4             POC PCO2 48.2             POC PH 7.348             POC PO2 187             POC SATURATED O2 100             POC TCO2 28             Potassium         4.4     PROTEIN TOTAL         6.8     Protein, UA   1+  Comment:  Recommend a 24 hour urine protein or a urine   protein/creatinine ratio if globulin induced proteinuria is  clinically suspected.             PT         46.9     Rate 16             RBC         2.71     RBC, UA   1           RDW         15.0     Sample ARTERIAL             SARS-CoV-2 RNA, Amplification, Qual       Negative  Comment:  This test utilizes isothermal nucleic acid amplification   technology to detect the SARS-CoV-2 RdRp nucleic acid segment.   The analytical sensitivity (limit of detection) is 125 genome   equivalents/mL.   A POSITIVE result implies infection with the SARS-CoV-2 virus;  the patient is presumed to be contagious.    A NEGATIVE result means that SARS-CoV-2 nucleic acids are not  present above the limit of detection. It does not rule out the   possibility of COVID-19 and should not be the sole basis for   treatment decisions. If COVID-19 is strongly suspected based on  clinical and exposure history, re-testing should be considered.   This test is only for use under the Food and Drug   Administration s Emergency Use  Authorization (EUA).   Commercial kits are provided by [employer].   Performance characteristics of the EUA have been independently  verified by [facility] Department of  Pathology and Laboratory Medicine.   _________________________________________________________________  The ID NOW COVID-19 Letter of Authorization, along with the   authorized Fact Sheet for Healthcare Providers, the authorized Fact  Sheet for Patients, and authorized labeling are available on the FDA   website:  www.fda.gov/MedicalDevices/Safety/EmergencySituations/rou278868.htm         Sodium         142     Specific Gravity, UA   1.020           Specimen UA   Urine, Clean Catch           Squam Epithel, UA   0           Troponin I         0.361  Comment:  Trop critical result(s) repeated. Called and verbal readback obtained   from Lindsey Martinez RN/ED by [facility] 05/11/2020 14:14       UROBILINOGEN UA   Negative           WBC, UA   30           WBC         9.06                        EKG  My personal interpretation: No acute ST elevation or depression appreciated. Atrial fibrillation    Significant Imaging:     CT Head Without Contrast [[id number]] Collected: 05/11/20 1421   Order Status: Completed Updated: 05/11/20 1435   Narrative:     CMS MANDATED QUALITY DATA - CT RADIATION  436    All CT scans at this facility utilize dose modulation, iterative  reconstruction, and/or weight based dosing when appropriate to reduce  radiation dose to as low as reasonably achievable.  CT head without contrast    Clinical data: Head trauma    FINDINGS: Noninfusion images were obtained from the skull base to the  vertex. There is no intracranial mass, hemorrhage, or midline shift.  Ventricles and sulci are mildly prominent. There are no pathologic  extra-axial fluid collections. There is no evidence of cortical  ischemic change. Changes of mild chronic microvascular white matter  disease are noted. Cerebellum and brainstem are normal. The  calvarium  is intact.    IMPRESSION:    1. No acute intracranial abnormalities. Chronic  findings as discussed above.         X-Ray Chest AP Portable [[id number]] Collected: 05/11/20 1214   Order Status: Completed Updated: 05/11/20 1248   Narrative:     Chest single view    CLINICAL DATA: Shortness of breath    FINDINGS: AP view is compared to December 2019.    The heart is mildly enlarged. Aortic arch is calcified.    There is mild diffuse interstitial prominence, with faint bilateral  groundglass opacities, most pronounced at the right lung base. There  are also probable tiny bilateral pleural effusions. Findings may be on  the basis of CHF/volume overload with pulmonary edema, although  bilateral pneumonia is also possible.    No acute osseous abnormalities are identified.    IMPRESSION:  1. Mild diffuse interstitial prominence with bilateral groundglass  opacities, most pronounced at the right lung base, and probable tiny  bilateral pleural effusions. Findings may be on the basis of  congestive failure/volume overload with pulmonary edema, although  pneumonia is also possible given this appearance.         Assessment/Plan:     Active Hospital Problems    Diagnosis    *Acute hypoxemic respiratory failure    Acute on chronic heart failure    COPD exacerbation    Supratherapeutic INR    Symptomatic anemia    Thrombocytopenia    COPD (chronic obstructive pulmonary disease)    Permanent atrial fibrillation    Hypertension    Hyperlipidemia    Hypothyroidism    Advanced age    CKD (chronic kidney disease)    DNR (do not resuscitate)       * Acute hypoxemic respiratory failure  Plan:  Likely multifactorial with acute heart failure and COPD exacerbation, possibly also symptomatic anemia from GI bleed with supratherapeutic INR  ICU  Lasix IV, Strict I/O, daily weights  Echo pending    Elevated troponin likely demand ischemia, NSTEMI type 2  Trending troponins  ASA    Bronchodilators, steroids  Procalcitonin  pending    Supratherapeutic INR with suspected GI bleed  Baseline Hb 12-13 (2019)  Iron studies, B12, folate    Suspected CKD  Baseline Cr 1.3  Vitamin-D, PTH    Urine cultures, blood cultures pending  IV Rocephin  Continue home medications.  Holding warfarin due to possible symptomatic anemia/GI bleed    Consult cardiology  Consult GI  Appreciate consultants    DNR      VTE Risk Mitigation (From admission, onward)         Ordered     IP VTE HIGH RISK PATIENT  Once      05/11/20 1503     Place sequential compression device  Until discontinued      05/11/20 1503                   Von Adair MD  Department of Hospital Medicine   [facility]  Date of service: 05/11/2020

## 2020-05-11 NOTE — HPI
HPI per patient and per ER records  89-year-old female with COPD, chronic heart failure, hyperlipidemia, hypothyroidism, chronic atrial fibrillation on warfarin, CKD, advanced age, DNR comes in for shortness of breath and fall.  Per ER records, patient had at accidental trip and fell and struck her head.  EMS was called and patient appeared short of breath however patient refused to come to the ED.  Patient continued to have shortness of breath during the day and EMS was subsequently called again and brought patient to the ED for further evaluation.  During my interview, patient is short of breath and tachypneic saturating 84% on 5 L nasal cannula.  Able to speak and wanted to worsen sepsis.  Reports living home alone.  Her son and daughter come see her regularly.  Patient was then placed on BiPAP.  Denies fever, chills, chest pain.  Denies hemoptysis, hematuria, melena, hematochezia, gross bleeding.    In the ED, RR 30s, 84% on 5 L, H&H 8.8/28.4 (Baseline 12-13 in 2019), INR 5.3, Cr 1.5 (BL 1.3), , troponin 0.361,  UA with leukocytes and many bacteria, head CT unremarkable, chest x-ray concern for acute heart failure.

## 2020-05-11 NOTE — ED TRIAGE NOTES
EMS states pt woke up feeling SOB and was given a breathing treatment by family prior to their arrival. EMS states pt was 90% on RA.

## 2020-05-11 NOTE — ED PROVIDER NOTES
Encounter Date: 2020       History     Chief Complaint   Patient presents with    Shortness of Breath     fall this morning     Patient presents complaining of shortness of breath and fall.  Patient states this morning she experienced a trip and fall and struck her head.  EMS was called and patient appeared short of breath at that time but patient at that time refused transfer.  Later in the morning patient continued to feel short of breath and ultimately EMS was called over again and patient was transferred to the hospital.  Patient has a history of congestive heart failure.  She is not on home oxygen.  Patient does feel generally weak.  At the worst symptoms are moderate.        Review of patient's allergies indicates:   Allergen Reactions    Pcn [penicillins]      Past Medical History:   Diagnosis Date    Afib     Hx of stroke without residual deficits     Hypothyroid     Rheumatoid arthritis      Past Surgical History:   Procedure Laterality Date    RIGHT OOPHORECTOMY      ROTATOR CUFF REPAIR Right      Family History   Problem Relation Age of Onset    Heart disease Mother     Heart disease Father      Social History     Tobacco Use    Smoking status: Former Smoker     Packs/day: 1.00     Years: 10.00     Pack years: 10.00     Types: Cigarettes     Last attempt to quit: 1980     Years since quittin.3    Smokeless tobacco: Never Used   Substance Use Topics    Alcohol use: No    Drug use: No     Review of Systems   Constitutional: Negative for fever.   Respiratory: Positive for shortness of breath. Negative for cough.    Neurological:        No loss of consciousness   All other systems reviewed and are negative.      Physical Exam     Initial Vitals [20 1158]   BP Pulse Resp Temp SpO2   (!) 157/69 93 (!) 36 97.5 °F (36.4 °C) (!) 90 %      MAP       --         Physical Exam    Nursing note and vitals reviewed.  Constitutional:   Elderly, weak, frail   HENT:   Head: Normocephalic and  atraumatic.   Mouth/Throat: Oropharynx is clear and moist.   Eyes: EOM are normal.   Right pupil is 3 mm and reactive, left pupil is abnormal 4mm and dilated, this is chronic from cataract surgery   Neck: Normal range of motion. Neck supple.   Cardiovascular: Normal rate, regular rhythm, normal heart sounds and intact distal pulses.   Pulmonary/Chest: Breath sounds normal. No respiratory distress.   Abdominal: Soft.   Musculoskeletal: Normal range of motion.   Neurological: She is alert and oriented to person, place, and time. She has normal strength. No cranial nerve deficit.   Vision - Normal  Aphasia - Normal  Neglect - Normal  Pronator drift - Normal  Cerebellum - Normal  RUE strength - Normal  RLE strength - Normal  LUE strength - Normal  LLE strength - Normal   Skin: Skin is warm and dry. Capillary refill takes less than 2 seconds.   Psychiatric: She has a normal mood and affect. Her behavior is normal. Judgment and thought content normal.         ED Course   Procedures  Labs Reviewed   CULTURE, BLOOD   CULTURE, BLOOD   SARS-COV-2 RNA AMPLIFICATION, QUAL    Narrative:     What symptom criteria does the patient meet?->Shortness of  breath or difficulty breathing   LACTIC ACID, PLASMA   CBC W/ AUTO DIFFERENTIAL   COMPREHENSIVE METABOLIC PANEL   TROPONIN I   B-TYPE NATRIURETIC PEPTIDE   PROTIME-INR   MAGNESIUM   PHOSPHORUS   B-TYPE NATRIURETIC PEPTIDE   URINALYSIS   PROCALCITONIN     EKG Readings: (Independently Interpreted)   EKG is atrial fibrillation, irregularly irregular, normal rate, no acute ST changes     ECG Results          EKG 12-lead (In process)  Result time 05/11/20 12:59:27    In process by Interface, [facility] (05/11/20 12:59:27)                 Narrative:    Test Reason : R06.02,    Vent. Rate : 077 BPM     Atrial Rate : 057 BPM     P-R Int : 000 ms          QRS Dur : 098 ms      QT Int : 390 ms       P-R-T Axes : 000 063 236 degrees     QTc Int : 441 ms    Atrial fibrillation  ST  depression, consider subendocardial injury  Abnormal QRS-T angle, consider primary T wave abnormality  Abnormal ECG  No previous ECGs available    Referred By: [email or web address]   SELF           Confirmed By:                             Imaging Results          X-Ray Chest AP Portable (Final result)  Result time 05/11/20 12:33:43    Final result by Horacio Hough MD (05/11/20 12:33:43)                 Narrative:    Chest single view    CLINICAL DATA: Shortness of breath    FINDINGS: AP view is compared to December 2019.    The heart is mildly enlarged. Aortic arch is calcified.    There is mild diffuse interstitial prominence, with faint bilateral  groundglass opacities, most pronounced at the right lung base. There  are also probable tiny bilateral pleural effusions. Findings may be on  the basis of CHF/volume overload with pulmonary edema, although  bilateral pneumonia is also possible.    No acute osseous abnormalities are identified.    IMPRESSION:  1. Mild diffuse interstitial prominence with bilateral groundglass  opacities, most pronounced at the right lung base, and probable tiny  bilateral pleural effusions. Findings may be on the basis of  congestive failure/volume overload with pulmonary edema, although  pneumonia is also possible given this appearance.    Electronically Signed by Paul Hough M.D. on 5/11/2020 12:44 PM                               Medical Decision Making:   ED Management:  Considerations include infection, pneumonia, congestive heart failure, COPD.  Patient's initial chest x-ray appears most likely to be pulmonary edema patient has received IV Lasix.  Initial COVID test is negative.  Blood work is pending.  I anticipate patient will be admitted.    While in the emergency department patient shortness of breath became worse sats dropped to 88% on 5 Lr.  Patient required BiPAP assistance.  Patient will be admitted to the ICU.  Arterial blood gas ordered.              Attending Attestation:          Attending Critical Care:   Critical Care Times:   Direct Patient Care (initial evaluation, reassessments, and time considering the case)................................................................15 minutes.   Ordering, Reviewing, and Interpreting Diagnostic Studies...............................................................................................................15 minutes.   Documentation..................................................................................................................................................................................5 minutes.   ==============================================================  · Total Critical Care Time - exclusive of procedural time: 35 minutes.  ==============================================================                            Clinical Impression:       ICD-10-CM ICD-9-CM   1. Congestive heart failure, unspecified HF chronicity, unspecified heart failure type I50.9 428.0   2. SOB (shortness of breath) R06.02 786.05   3. Chest pain R07.9 786.50   4. Acute hypoxemic respiratory failure J96.01 518.81   5. Acute heart failure I50.9 428.9                                Armaan Farr MD  05/11/20 1504       Armaan Farr MD  06/07/20 0749

## 2020-05-11 NOTE — ED NOTES
"SOB PTA.  FALLS PRECAUTIONS. REPORTED FALL FROM CHAIR TO FLOOR EARLIER TODAY REPORTED BY EMS.  L POSTERIOR FA BRUISING. FRAIL. INCREASED RR AT ARRIVAL WITH ABDOMINAL MUSCLE USE.  AIRWAY PATENT.  A FIB AT MONITOR.  PITTING EDEMA AT R/L ANKLES WITHOUT GROSS SWELLING NOTED.  FRAGILE SKIN.  PALE.  ABDOMIN NON DISTENDED. ALFARO.  <3" CAPILLARY REFILL AND ++ PULSES X 4 EXTREMITIES. MONITORING AND 02 AT 2 LNC.  MASK AND PRIVATE ROOM.  "

## 2020-05-11 NOTE — PLAN OF CARE
05/11/20 1746   Patient Assessment/Suction   Level of Consciousness (AVPU) alert   Respiratory Effort Normal;Unlabored   Expansion/Accessory Muscles/Retractions no use of accessory muscles;no retractions   Rhythm/Pattern, Respiratory unlabored;pattern regular;depth regular   PRE-TX-O2   O2 Device (Oxygen Therapy) BiPAP   $ Is the patient on Low Flow Oxygen? Yes   SpO2 (!) 90 %   Pulse Oximetry Type Continuous   $ Pulse Oximetry - Multiple Charge Pulse Oximetry - Multiple   Pulse 76   Resp (!) 41   Preset CPAP/BiPAP Settings   Mode Of Delivery BiPAP   Ipap 10   EPAP (cm H2O) 5   Pressure Support (cm H2O) 5   Set Rate (Breaths/Min) 16   ITime (sec) 1   Rise Time (sec) 3   Patient CPAP/BiPAP Settings   RR Total (Breaths/Min) 29   Tidal Volume (mL) 619   VE Minute Ventilation (L/min) 15.4 L/min   Peak Inspiratory Pressure (cm H2O) 11   TiTOT (%) 35   Total Leak (L/Min) 5   Patient Trigger - ST Mode Only (%) 97   CPAP/BiPAP Backup Settings   Backup Rate 16 breaths per minute (bpm)   CPAP/BiPAP Alarms   High Pressure (cm H2O) 40   Low Pressure (cm H2O) 10   Low Pressure Delay (Sec) 10   Minute Ventilation (L/Min) 3   High RR (breaths/min) 40   Low RR (breaths/min) 10   Respiratory Evaluation   $ Care Plan Tech Time 15 min   Evaluation For   (CARE PLAN)

## 2020-05-11 NOTE — SUBJECTIVE & OBJECTIVE
Past Medical History:   Diagnosis Date    Afib     Hx of stroke without residual deficits     Hypothyroid     Rheumatoid arthritis        Past Surgical History:   Procedure Laterality Date    RIGHT OOPHORECTOMY      ROTATOR CUFF REPAIR Right        Review of patient's allergies indicates:   Allergen Reactions    Pcn [penicillins]        No current facility-administered medications on file prior to encounter.      Current Outpatient Medications on File Prior to Encounter   Medication Sig    albuterol-ipratropium (DUO-NEB) 2.5 mg-0.5 mg/3 mL nebulizer solution Take 3 mLs by nebulization every 6 (six) hours as needed for Wheezing. Rescue    allopurinol (ZYLOPRIM) 100 MG tablet Take 100 mg by mouth once daily.     amlodipine (NORVASC) 5 MG tablet Take 5 mg by mouth once daily.     calcitRIOL (ROCALTROL) 0.25 MCG Cap Take 0.25 mcg by mouth once daily.     FUROSEMIDE (LASIX ORAL) Take 40 mg by mouth once daily.     levothyroxine (LEVOXYL) 88 MCG tablet Take 88 mcg by mouth once daily.     metoprolol succinate (TOPROL-XL) 25 MG 24 hr tablet Take 25 mg by mouth once daily.     multivitamin (THERAGRAN) per tablet Take 1 tablet by mouth once daily.    simvastatin (ZOCOR) 20 MG tablet Take 20 mg by mouth every evening.     spironolactone (ALDACTONE) 25 MG tablet Take 25 mg by mouth once daily.     TRELEGY ELLIPTA 100-62.5-25 mcg DsDv Inhale 1 puff into the lungs every 4 to 6 hours as needed.     warfarin (COUMADIN) 5 MG tablet Take 5 mg by mouth every Tuesday, Thursday, Saturday, Sunday.    benzonatate (TESSALON) 100 MG capsule TAKE 1-2 CAPSULES BY MOUTH EVERY 8 HOURS AS NEEDED FOR COUGH    WARFARIN SODIUM (COUMADIN ORAL) Take 6 mg by mouth every Mon, Wed, Fri.     [DISCONTINUED] BENAZEPRIL HCL (LOTENSIN ORAL) Take by mouth.    [DISCONTINUED] benazepril-hydrochlorthiazide (LOTENSIN HCT) 20-12.5 mg per tablet Take 1 tablet by mouth 2 (two) times daily.    [DISCONTINUED] KLOR-CON SPRINKLE 10 mEq CpSR      [DISCONTINUED] predniSONE (DELTASONE) 20 MG tablet TAKE 2 TABLETS BY MOUTH ONCE DAILY FOR 5 DAYS    [DISCONTINUED] silver sulfADIAZINE 1% (SILVADENE) 1 % cream APPLY TO THE AFFECTED AREA THREE TIMES A DAY     Family History     Problem Relation (Age of Onset)    Heart disease Mother, Father        Tobacco Use    Smoking status: Former Smoker     Packs/day: 1.00     Years: 10.00     Pack years: 10.00     Types: Cigarettes     Last attempt to quit: 1980     Years since quittin.3    Smokeless tobacco: Never Used   Substance and Sexual Activity    Alcohol use: No    Drug use: No    Sexual activity: Not on file     Review of Systems   Unable to perform ROS: Acuity of condition     Objective:     Vital Signs (Most Recent):  Temp: 97.5 °F (36.4 °C) (20 1158)  Pulse: 97 (20 1403)  Resp: (!) 28 (20 1330)  BP: (!) 151/93 (20 1403)  SpO2: 95 % (20 1403) Vital Signs (24h Range):  Temp:  [97.5 °F (36.4 °C)] 97.5 °F (36.4 °C)  Pulse:  [78-97] 97  Resp:  [27-36] 28  SpO2:  [90 %-100 %] 95 %  BP: (151-178)/(67-93) 151/93     Weight: 62 kg (136 lb 11 oz)  Body mass index is 23.46 kg/m².    Physical Exam   Constitutional: She appears well-developed and well-nourished. She appears distressed.   Frail appearing, 1-2 words sentences   HENT:   Head: Normocephalic and atraumatic.   Right Ear: External ear normal.   Left Ear: External ear normal.   Nose: Nose normal.   Mouth/Throat: Oropharynx is clear and moist. No oropharyngeal exudate.   Eyes: Pupils are equal, round, and reactive to light. Conjunctivae and EOM are normal. Right eye exhibits no discharge. Left eye exhibits no discharge. No scleral icterus.   Neck: Normal range of motion. Neck supple. No thyromegaly present.   Cardiovascular: Normal rate, normal heart sounds and intact distal pulses. Exam reveals no gallop and no friction rub.   No murmur heard.  IRR, no edema   Pulmonary/Chest: She is in respiratory distress. She exhibits no  tenderness.   Crackles in BL lower lungs, mild end expiratory wheezing in all lung fields, diminished breath sounds BL   Abdominal: Soft. Bowel sounds are normal. She exhibits no distension and no mass. There is no tenderness. There is no rebound and no guarding. No hernia.   Musculoskeletal: Normal range of motion. She exhibits no edema or tenderness.   Lymphadenopathy:     She has no cervical adenopathy.   Neurological: She is alert.   Skin: Skin is warm. She is not diaphoretic.   Nursing note and vitals reviewed.        CRANIAL NERVES     CN III, IV, VI   Pupils are equal, round, and reactive to light.  Extraocular motions are normal.        Significant Labs:   ABGs:   Recent Labs   Lab 05/11/20  1517   PH 7.348*   PCO2 48.2*   HCO3 26.4   POCSATURATED 100   BE 1     CBC:   Recent Labs   Lab 05/11/20  1220   WBC 9.06   HGB 8.8*   HCT 28.4*   PLT 81*     CMP:   Recent Labs   Lab 05/11/20  1220      K 4.4      CO2 26   *   BUN 37*   CREATININE 1.5*   CALCIUM 9.5   PROT 6.8   ALBUMIN 3.7   BILITOT 1.2*   ALKPHOS 65   AST 25   ALT 16   ANIONGAP 7*   EGFRNONAA 29.4*     Cardiac Markers:   Recent Labs   Lab 05/11/20  1220   *  892*     Coagulation:   Recent Labs   Lab 05/11/20  1220   INR 5.3*     Troponin:   Recent Labs   Lab 05/11/20  1220   TROPONINI 0.361*     Urine Studies:   Recent Labs   Lab 05/11/20  1332   COLORU Yellow   APPEARANCEUA Hazy*   PHUR 6.0   SPECGRAV 1.020   PROTEINUA 1+*   GLUCUA Negative   KETONESU Negative   BILIRUBINUA Negative   OCCULTUA Negative   NITRITE Negative   UROBILINOGEN Negative   LEUKOCYTESUR 2+*   RBCUA 1   WBCUA 30*   BACTERIA Many*   SQUAMEPITHEL 0   HYALINECASTS 35*     Recent Lab Results       05/11/20  1517   05/11/20  1332   05/11/20  1313   05/11/20  1258   05/11/20  1220        Albumin         3.7     Alkaline Phosphatase         65     Allens Test Pass             ALT         16     Anion Gap         7     Appearance, UA   Hazy           AST          25     Bacteria, UA   Many           Baso #         0.04     Basophil%         0.4     Bilirubin (UA)   Negative           BILIRUBIN TOTAL         1.2  Comment:  For infants and newborns, interpretation of results should be based  on gestational age, weight and in agreement with clinical  observations.  Premature Infant recommended reference ranges:  Up to 24 hours.............<8.0 mg/dL  Up to 48 hours............<12.0 mg/dL  3-5 days..................<15.0 mg/dL  6-29 days.................<15.0 mg/dL       BNP         892  Comment:  Values of less than 100 pg/ml are consistent with non-CHF populations.              892  Comment:  Values of less than 100 pg/ml are consistent with non-CHF populations.     Site RR             BUN, Bld         37     Calcium         9.5     Chloride         109     CO2         26     Color, UA   Yellow           Creatinine         1.5     DelSys CPAP/BiPAP             Differential Method         Automated     eGFR if          33.9     eGFR if non          29.4  Comment:  Calculation used to obtain the estimated glomerular filtration  rate (eGFR) is the CKD-EPI equation.        Eos #         0.0     Eosinophil%         0.0     EP 5             FiO2 0.50             Glucose         119     Glucose, UA   Negative           Gran # (ANC)         8.3     Gran%         91.3     Hematocrit         28.4     Hemoglobin         8.8     Hyaline Casts, UA   35           Immature Grans (Abs)         0.03  Comment:  Mild elevation in immature granulocytes is non specific and   can be seen in a variety of conditions including stress response,   acute inflammation, trauma and pregnancy. Correlation with other   laboratory and clinical findings is essential.       Immature Granulocytes         0.3     INR         5.3  Comment:  Coumadin Therapy:  INR: 2.0-3.0 conventional anticoagulation  INR: 2.5-3.5 intensive anticoagulation  INR critical result(s) called and verbal  readback obtained from Lindsey Martinez RN ED by FF1 05/11/2020 14:13       IP 10             Ketones, UA   Negative           Lactate, Elian     1.5  Comment:  Falsely low lactic acid results can be found in samples   containing >=13.0 mg/dL total bilirubin and/or >=3.5 mg/dL   direct bilirubin.           Leukocytes, UA   2+           Lymph #         0.4     Lymph%         3.9     Magnesium         2.3     MCH         32.5     MCHC         31.0     MCV         105     Microscopic Comment   SEE COMMENT  Comment:  Other formed elements not mentioned in the report are not   present in the microscopic examination.              Mode BiPAP             Mono #         0.4     Mono%         4.1     MPV         12.7     NITRITE UA   Negative           nRBC         0     Occult Blood UA   Negative           pH, UA   6.0           Phosphorus         4.0     Platelets         81     POC BE 1             POC HCO3 26.4             POC PCO2 48.2             POC PH 7.348             POC PO2 187             POC SATURATED O2 100             POC TCO2 28             Potassium         4.4     PROTEIN TOTAL         6.8     Protein, UA   1+  Comment:  Recommend a 24 hour urine protein or a urine   protein/creatinine ratio if globulin induced proteinuria is  clinically suspected.             PT         46.9     Rate 16             RBC         2.71     RBC, UA   1           RDW         15.0     Sample ARTERIAL             SARS-CoV-2 RNA, Amplification, Qual       Negative  Comment:  This test utilizes isothermal nucleic acid amplification   technology to detect the SARS-CoV-2 RdRp nucleic acid segment.   The analytical sensitivity (limit of detection) is 125 genome   equivalents/mL.   A POSITIVE result implies infection with the SARS-CoV-2 virus;  the patient is presumed to be contagious.    A NEGATIVE result means that SARS-CoV-2 nucleic acids are not  present above the limit of detection. It does not rule out the   possibility of COVID-19  and should not be the sole basis for   treatment decisions. If COVID-19 is strongly suspected based on  clinical and exposure history, re-testing should be considered.   This test is only for use under the Food and Drug   Administration s Emergency Use Authorization (EUA).   Commercial kits are provided by [employer].   Performance characteristics of the EUA have been independently  verified by [facility] Department of  Pathology and Laboratory Medicine.   _________________________________________________________________  The ID NOW COVID-19 Letter of Authorization, along with the   authorized Fact Sheet for Healthcare Providers, the authorized Fact  Sheet for Patients, and authorized labeling are available on the FDA   website:  www.fda.gov/MedicalDevices/Safety/EmergencySituations/amj318985.htm         Sodium         142     Specific Gravity, UA   1.020           Specimen UA   Urine, Clean Catch           Squam Epithel, UA   0           Troponin I         0.361  Comment:  Trop critical result(s) repeated. Called and verbal readback obtained   from Lindsey Martinez RN/ED by [facility] 05/11/2020 14:14       UROBILINOGEN UA   Negative           WBC, UA   30           WBC         9.06                        EKG  My personal interpretation: No acute ST elevation or depression appreciated. Atrial fibrillation    Significant Imaging:     CT Head Without Contrast [[id number]] Collected: 05/11/20 1421   Order Status: Completed Updated: 05/11/20 1435   Narrative:     CMS MANDATED QUALITY DATA - CT RADIATION  436    All CT scans at this facility utilize dose modulation, iterative  reconstruction, and/or weight based dosing when appropriate to reduce  radiation dose to as low as reasonably achievable.  CT head without contrast    Clinical data: Head trauma    FINDINGS: Noninfusion images were obtained from the skull base to the  vertex. There is no intracranial mass, hemorrhage, or midline shift.  Ventricles and  sulci are mildly prominent. There are no pathologic  extra-axial fluid collections. There is no evidence of cortical  ischemic change. Changes of mild chronic microvascular white matter  disease are noted. Cerebellum and brainstem are normal. The calvarium  is intact.    IMPRESSION:    1. No acute intracranial abnormalities. Chronic  findings as discussed above.         X-Ray Chest AP Portable [[id number]] Collected: 05/11/20 1214   Order Status: Completed Updated: 05/11/20 1248   Narrative:     Chest single view    CLINICAL DATA: Shortness of breath    FINDINGS: AP view is compared to December 2019.    The heart is mildly enlarged. Aortic arch is calcified.    There is mild diffuse interstitial prominence, with faint bilateral  groundglass opacities, most pronounced at the right lung base. There  are also probable tiny bilateral pleural effusions. Findings may be on  the basis of CHF/volume overload with pulmonary edema, although  bilateral pneumonia is also possible.    No acute osseous abnormalities are identified.    IMPRESSION:  1. Mild diffuse interstitial prominence with bilateral groundglass  opacities, most pronounced at the right lung base, and probable tiny  bilateral pleural effusions. Findings may be on the basis of  congestive failure/volume overload with pulmonary edema, although  pneumonia is also possible given this appearance.

## 2020-05-12 ENCOUNTER — CLINICAL SUPPORT (OUTPATIENT)
Dept: CARDIOLOGY | Facility: HOSPITAL | Age: 85
DRG: 291 | End: 2020-05-12
Attending: FAMILY MEDICINE
Payer: COMMERCIAL

## 2020-05-12 VITALS — WEIGHT: 142.19 LBS | HEIGHT: 64 IN | BODY MASS INDEX: 24.28 KG/M2

## 2020-05-12 PROBLEM — I50.9 CONGESTIVE HEART FAILURE: Status: ACTIVE | Noted: 2020-05-12

## 2020-05-12 PROBLEM — J96.01 ACUTE HYPOXEMIC RESPIRATORY FAILURE: Status: RESOLVED | Noted: 2020-05-11 | Resolved: 2020-05-12

## 2020-05-12 LAB
ANION GAP SERPL CALC-SCNC: 10 MMOL/L (ref 8–16)
AORTIC ROOT ANNULUS: 2.88 CM
AORTIC VALVE CUSP SEPERATION: 0.56 CM
AV INDEX (PROSTH): 0.38
AV MEAN GRADIENT: 21 MMHG
AV PEAK GRADIENT: 37 MMHG
AV VALVE AREA: 1.18 CM2
AV VELOCITY RATIO: 32.26
BSA FOR ECHO PROCEDURE: 1.71 M2
BUN SERPL-MCNC: 37 MG/DL (ref 10–30)
CALCIUM SERPL-MCNC: 9.5 MG/DL (ref 8.7–10.5)
CHLORIDE SERPL-SCNC: 107 MMOL/L (ref 95–110)
CO2 SERPL-SCNC: 24 MMOL/L (ref 23–29)
CREAT SERPL-MCNC: 1.4 MG/DL (ref 0.5–1.4)
CV ECHO LV RWT: 0.4 CM
DOP CALC AO PEAK VEL: 3.05 M/S
DOP CALC AO VTI: 63.65 CM
DOP CALC LVOT AREA: 3.1 CM2
DOP CALC LVOT DIAMETER: 1.99 CM
DOP CALC LVOT PEAK VEL: 98.39 M/S
DOP CALC LVOT STROKE VOLUME: 75.32 CM3
DOP CALCLVOT PEAK VEL VTI: 24.23 CM
E WAVE DECELERATION TIME: 194.76 MSEC
E/E' RATIO: 20.71 M/S
ECHO LV POSTERIOR WALL: 0.94 CM (ref 0.6–1.1)
ERYTHROCYTE [DISTWIDTH] IN BLOOD BY AUTOMATED COUNT: 14.9 % (ref 11.5–14.5)
EST. GFR  (AFRICAN AMERICAN): 36.8 ML/MIN/1.73 M^2
EST. GFR  (NON AFRICAN AMERICAN): 32 ML/MIN/1.73 M^2
FRACTIONAL SHORTENING: 32 % (ref 28–44)
GLUCOSE SERPL-MCNC: 173 MG/DL (ref 70–110)
HCT VFR BLD AUTO: 28.5 % (ref 37–48.5)
HCT VFR BLD AUTO: 28.9 % (ref 37–48.5)
HGB BLD-MCNC: 8.9 G/DL (ref 12–16)
HGB BLD-MCNC: 9.1 G/DL (ref 12–16)
INTERVENTRICULAR SEPTUM: 0.94 CM (ref 0.6–1.1)
IVRT: 38.68 MSEC
LEFT ATRIUM SIZE: 4.8 CM
LEFT INTERNAL DIMENSION IN SYSTOLE: 3.18 CM (ref 2.1–4)
LEFT VENTRICLE MASS INDEX: 88 G/M2
LEFT VENTRICULAR INTERNAL DIMENSION IN DIASTOLE: 4.66 CM (ref 3.5–6)
LEFT VENTRICULAR MASS: 149.14 G
LV LATERAL E/E' RATIO: 17.6 M/S
LV SEPTAL E/E' RATIO: 25.14 M/S
MAGNESIUM SERPL-MCNC: 2.3 MG/DL (ref 1.6–2.6)
MCH RBC QN AUTO: 33 PG (ref 27–31)
MCHC RBC AUTO-ENTMCNC: 31.2 G/DL (ref 32–36)
MCV RBC AUTO: 106 FL (ref 82–98)
MV PEAK E VEL: 1.76 M/S
PHOSPHATE SERPL-MCNC: 4.1 MG/DL (ref 2.7–4.5)
PISA TR MAX VEL: 4.28 M/S
PLATELET # BLD AUTO: 100 K/UL (ref 150–350)
PMV BLD AUTO: 11.6 FL (ref 9.2–12.9)
POTASSIUM SERPL-SCNC: 4.8 MMOL/L (ref 3.5–5.1)
PV PEAK VELOCITY: 92.89 CM/S
RA PRESSURE: 8 MMHG
RBC # BLD AUTO: 2.7 M/UL (ref 4–5.4)
RIGHT VENTRICULAR END-DIASTOLIC DIMENSION: 227 CM
SODIUM SERPL-SCNC: 141 MMOL/L (ref 136–145)
TDI LATERAL: 0.1 M/S
TDI SEPTAL: 0.07 M/S
TDI: 0.09 M/S
TR MAX PG: 73 MMHG
TROPONIN I SERPL DL<=0.01 NG/ML-MCNC: 1.2 NG/ML
TV REST PULMONARY ARTERY PRESSURE: 81 MMHG
WBC # BLD AUTO: 7.37 K/UL (ref 3.9–12.7)

## 2020-05-12 PROCEDURE — 85014 HEMATOCRIT: CPT

## 2020-05-12 PROCEDURE — 92610 EVALUATE SWALLOWING FUNCTION: CPT

## 2020-05-12 PROCEDURE — 27000221 HC OXYGEN, UP TO 24 HOURS

## 2020-05-12 PROCEDURE — 80048 BASIC METABOLIC PNL TOTAL CA: CPT

## 2020-05-12 PROCEDURE — 63600175 PHARM REV CODE 636 W HCPCS: Performed by: FAMILY MEDICINE

## 2020-05-12 PROCEDURE — 94660 CPAP INITIATION&MGMT: CPT

## 2020-05-12 PROCEDURE — 94640 AIRWAY INHALATION TREATMENT: CPT

## 2020-05-12 PROCEDURE — 99900035 HC TECH TIME PER 15 MIN (STAT)

## 2020-05-12 PROCEDURE — 36415 COLL VENOUS BLD VENIPUNCTURE: CPT

## 2020-05-12 PROCEDURE — 85027 COMPLETE CBC AUTOMATED: CPT

## 2020-05-12 PROCEDURE — 93005 ELECTROCARDIOGRAM TRACING: CPT | Performed by: INTERNAL MEDICINE

## 2020-05-12 PROCEDURE — 25000003 PHARM REV CODE 250: Performed by: FAMILY MEDICINE

## 2020-05-12 PROCEDURE — 25000003 PHARM REV CODE 250: Performed by: INTERNAL MEDICINE

## 2020-05-12 PROCEDURE — 25000242 PHARM REV CODE 250 ALT 637 W/ HCPCS: Performed by: FAMILY MEDICINE

## 2020-05-12 PROCEDURE — 85018 HEMOGLOBIN: CPT

## 2020-05-12 PROCEDURE — 21400001 HC TELEMETRY ROOM

## 2020-05-12 PROCEDURE — 83735 ASSAY OF MAGNESIUM: CPT

## 2020-05-12 PROCEDURE — 63600175 PHARM REV CODE 636 W HCPCS: Performed by: INTERNAL MEDICINE

## 2020-05-12 PROCEDURE — 84100 ASSAY OF PHOSPHORUS: CPT

## 2020-05-12 PROCEDURE — 94761 N-INVAS EAR/PLS OXIMETRY MLT: CPT

## 2020-05-12 PROCEDURE — 25000003 PHARM REV CODE 250

## 2020-05-12 PROCEDURE — 93306 TTE W/DOPPLER COMPLETE: CPT

## 2020-05-12 PROCEDURE — 84484 ASSAY OF TROPONIN QUANT: CPT

## 2020-05-12 RX ORDER — MUPIROCIN 20 MG/G
OINTMENT TOPICAL 2 TIMES DAILY
Status: DISCONTINUED | OUTPATIENT
Start: 2020-05-12 | End: 2020-05-16 | Stop reason: HOSPADM

## 2020-05-12 RX ORDER — CHLORHEXIDINE GLUCONATE ORAL RINSE 1.2 MG/ML
15 SOLUTION DENTAL 2 TIMES DAILY
Status: DISCONTINUED | OUTPATIENT
Start: 2020-05-12 | End: 2020-05-16 | Stop reason: HOSPADM

## 2020-05-12 RX ADMIN — METOPROLOL SUCCINATE 25 MG: 25 TABLET, FILM COATED, EXTENDED RELEASE ORAL at 09:05

## 2020-05-12 RX ADMIN — FLUTICASONE FUROATE AND VILANTEROL TRIFENATATE 1 PUFF: 100; 25 POWDER RESPIRATORY (INHALATION) at 07:05

## 2020-05-12 RX ADMIN — CHLORHEXIDINE GLUCONATE 15 ML: 1.2 RINSE ORAL at 08:05

## 2020-05-12 RX ADMIN — METHYLPREDNISOLONE SODIUM SUCCINATE 40 MG: 40 INJECTION, POWDER, FOR SOLUTION INTRAMUSCULAR; INTRAVENOUS at 01:05

## 2020-05-12 RX ADMIN — METHYLPREDNISOLONE SODIUM SUCCINATE 40 MG: 40 INJECTION, POWDER, FOR SOLUTION INTRAMUSCULAR; INTRAVENOUS at 05:05

## 2020-05-12 RX ADMIN — LEVOTHYROXINE SODIUM 88 MCG: 88 TABLET ORAL at 05:05

## 2020-05-12 RX ADMIN — SIMVASTATIN 20 MG: 20 TABLET, FILM COATED ORAL at 08:05

## 2020-05-12 RX ADMIN — FUROSEMIDE 40 MG: 10 INJECTION, SOLUTION INTRAMUSCULAR; INTRAVENOUS at 09:05

## 2020-05-12 RX ADMIN — AMLODIPINE BESYLATE 5 MG: 5 TABLET ORAL at 09:05

## 2020-05-12 RX ADMIN — MELATONIN 6 MG: at 08:05

## 2020-05-12 RX ADMIN — ALLOPURINOL 100 MG: 100 TABLET ORAL at 09:05

## 2020-05-12 RX ADMIN — IPRATROPIUM BROMIDE AND ALBUTEROL SULFATE 3 ML: .5; 3 SOLUTION RESPIRATORY (INHALATION) at 07:05

## 2020-05-12 RX ADMIN — MUPIROCIN: 20 OINTMENT TOPICAL at 10:05

## 2020-05-12 RX ADMIN — CHLORHEXIDINE GLUCONATE 15 ML: 1.2 RINSE ORAL at 09:05

## 2020-05-12 RX ADMIN — CEFTRIAXONE 1 G: 1 INJECTION, SOLUTION INTRAVENOUS at 05:05

## 2020-05-12 RX ADMIN — SPIRONOLACTONE 25 MG: 25 TABLET ORAL at 09:05

## 2020-05-12 RX ADMIN — MUPIROCIN: 20 OINTMENT TOPICAL at 08:05

## 2020-05-12 NOTE — PROGRESS NOTES
"[facility]  Adult Nutrition   Progress Note (Initial Assessment)     SUMMARY     Recommendations  Recommendation/Intervention: 1. Recommend diet be advanced as medically able. 2. Encourage PO intake of meals. 3. Recommend vitamin D supplementation due to vitamin D insufficiency per lab results [id number] = 26.   Goals: 1. Diet to be advanced and patient to tolerate. 2. Patient to meet at least 75% of estimated energy and protein needs.   Nutrition Goal Status: new    Dietitian Rounds Brief  RD unable to visit patient at time of rounds. Pt transferred out of ICU after rounds. RD assessed needs due to advanced age and only on clear liquid diet. Per progress notes pt passed swallow evaluation and no indication of GI bleed was found. RD sees no reason why diet cannot be advanced. Will monitor diet tolerance and advancement and make additional recommendations accordingly.     Reason for Assessment  Reason For Assessment: other (see comments)(ICU status, advanced age, RD identified )  Relevant Medical History: COPD, A.Fib, HTN, HLD, advanced age, CHF, symptomatc anemia; Hx of stroke without residual deficits  Interdisciplinary Rounds: attended  Nutrition Discharge Planning: least restrictive diet as tolerated     Nutrition Risk Screen  Nutrition Risk Screen: no indicators present             Nutrition/Diet History  Food Allergies: NKFA  Factors Affecting Nutritional Intake: clear liquid diet    Anthropometrics  Temp: 97.6 °F (36.4 °C)  Height Method: Stated  Height: 5' 4" (162.6 cm)  Height (inches): 64 in  Weight Method: Bed Scale  Weight: 64.5 kg (142 lb 3.2 oz)  Weight (lb): 142.2 lb  Ideal Body Weight (IBW), Female: 120 lb  % Ideal Body Weight, Female (lb): 119.05 %  BMI (Calculated): 24.4  BMI Grade: 18.5-24.9 - normal       Weight History:  Wt Readings from Last 10 Encounters:   05/12/20 64.5 kg (142 lb 3.2 oz)   05/12/20 64.5 kg (142 lb 3.2 oz)   10/16/17 62.1 kg (137 lb)   01/07/11 71.4 kg (157 lb " 7.2 oz)       Lab/Procedures/Meds: Pertinent Labs Reviewed  Clinical Chemistry:  Recent Labs   Lab 05/11/20  1220 05/12/20  0347    141   K 4.4 4.8    107   CO2 26 24   * 173*   BUN 37* 37*   CREATININE 1.5* 1.4   CALCIUM 9.5 9.5   PROT 6.8  --    ALBUMIN 3.7  --    BILITOT 1.2*  --    ALKPHOS 65  --    AST 25  --    ALT 16  --    ANIONGAP 7* 10   ESTGFRAFRICA 33.9* 36.8*   EGFRNONAA 29.4* 32.0*   MG 2.3 2.3   PHOS 4.0 4.1     CBC:   Recent Labs   Lab 05/12/20  0347   WBC 7.37   RBC 2.70*   HGB 8.9*   HCT 28.5*   *   *   MCH 33.0*   MCHC 31.2*     Cardiac Profile:  Recent Labs   Lab 05/11/20  1220 05/11/20  1801 05/12/20  0347   *  892* 1,222*  --    TROPONINI 0.361* 1.130* 1.201*     Vitamins:  Recent Labs   Lab 05/11/20  1801   [id number] 450   [id number] 26*       Medications: Pertinent Medications reviewed  Scheduled Meds:   allopurinoL  100 mg Oral Daily    amLODIPine  5 mg Oral Daily    aspirin  325 mg Oral Once    cefTRIAXone (ROCEPHIN) IVPB  1 g Intravenous Q24H    chlorhexidine  15 mL Mouth/Throat BID    fluticasone furoate-vilanteroL  1 puff Inhalation Daily    furosemide (LASIX) IV  40 mg Intravenous Daily    levothyroxine  88 mcg Oral Daily    methylPREDNISolone sodium succinate  40 mg Intravenous Q6H    metoprolol succinate  25 mg Oral Daily    mupirocin   Nasal BID    simvastatin  20 mg Oral QHS    spironolactone  25 mg Oral Daily     Continuous Infusions:  PRN Meds:.acetaminophen, acetaminophen, albuterol-ipratropium, calcium chloride IVPB, calcium chloride IVPB, calcium chloride IVPB, magnesium oxide, magnesium sulfate IVPB, magnesium sulfate IVPB, magnesium sulfate IVPB, magnesium sulfate IVPB, melatonin, ondansetron, polyethylene glycol, potassium chloride in water, potassium chloride in water, potassium chloride in water, potassium chloride in water, potassium chloride, potassium chloride, potassium chloride, potassium chloride, sodium  chloride 0.9%, sodium phosphate IVPB, sodium phosphate IVPB, sodium phosphate IVPB, sodium phosphate IVPB, sodium phosphate IVPB    Estimated/Assessed Needs  Weight Used For Calorie Calculations: 64.5 kg (142 lb 3.2 oz)  Energy Calorie Requirements (kcal): 1613 - 1935 (25 - 30)   Energy Need Method: Kcal/kg  Protein Requirements: 65 (1 g/kg)   Weight Used For Protein Calculations: 64.5 kg (142 lb 3.2 oz)  Fluid Requirements (mL): 1613 (25 ml/kg) or per MD   Estimated Fluid Requirement Method: RDA Method  RDA Method (mL): 1613       Nutrition Prescription Ordered  Current Diet Order: Clear Liquid     Evaluation of Received Nutrient/Fluid Intake  Energy Calories Required: not meeting needs  Protein Required: not meeting needs  Fluid Required: meeting needs  Tolerance: tolerating     Intake/Output Summary (Last 24 hours) at 5/12/2020 1756  Last data filed at 5/12/2020 0825  Gross per 24 hour   Intake 100 ml   Output 200 ml   Net -100 ml      % Intake of Estimated Energy Needs: 0 - 25 %  % Meal Intake: 0 - 25 %    Nutrition Risk  Level of Risk/Frequency of Follow-up: high     Monitor and Evaluation  Food and Nutrient Intake: energy intake, food and beverage intake  Food and Nutrient Adminstration: diet order  Physical Activity and Function: nutrition-related ADLs and IADLs, factors affecting access to physical activity  Anthropometric Measurements: weight, weight change, body mass index  Biochemical Data, Medical Tests and Procedures: electrolyte and renal panel, glucose/endocrine profile, lipid profile, gastrointestinal profile, inflammatory profile  Nutrition-Focused Physical Findings: overall appearance     Nutrition Follow-Up  RD Follow-up?: Yes    Desirae Macias RD 05/12/2020 5:59 PM

## 2020-05-12 NOTE — PROGRESS NOTES
[facility]  Department of Cardiology  Progress Note    Patient name: Jillian Borrego  MRN: [id number]  Today's Date: 05/12/2020  Admit Date: 5/11/2020    SUBJECTIVE     Principal Problem: Acute hypoxemic respiratory failure    Interval History:    Patient is feeling much better this morning and she is on nasal cannula off the BiPAP saturations have improved.  She appears less tachypneic and more comfortable.  Denies chest discomfort arm neck or jaw pain      Review of patient's allergies indicates:   Allergen Reactions    Pcn [penicillins]        REVIEW OF SYSTEMS  Denies Chest pain, sob, or palpitations  No recent fever, cough chills or congestion  No blood in the urine or stool  No myalgias  No recent arm, neck, or jaw pain  No lightheadedness or dizziness  No Double vision, blurry, vision or headache     OBJECTIVE     Vital Signs (Most Recent)  Temp: 98.6 °F (37 °C) (05/12/20 0701)  Pulse: 79 (05/12/20 0800)  Resp: (!) 26 (05/12/20 0800)  BP: (!) 145/65 (05/12/20 0800)  SpO2: 95 % (05/12/20 0800)    I & O (Last 24H):    Intake/Output Summary (Last 24 hours) at 5/12/2020 1155  Last data filed at 5/12/2020 0825  Gross per 24 hour   Intake 100 ml   Output 900 ml   Net -800 ml       WEIGHTS LAST 4 READINGS  Wt Readings from Last 4 Encounters:   05/12/20 64.5 kg (142 lb 3.2 oz)   05/12/20 64.5 kg (142 lb 3.2 oz)   10/16/17 62.1 kg (137 lb)   01/07/11 71.4 kg (157 lb 7.2 oz)       PHYSICAL EXAM  Constitutional: Well built, well nourished in no apparent distress  Neck: no carotid bruit, no JVD  Lungs:  Coarse breath sounds with some rhonchi bilaterally and expiratory wheezes   Chest Wall: no tenderness  Heart: regular rate and rhythm, S1, S2 normal, no murmur, click, rub or gallop   Abdomen: soft, non-tender; bowel sounds normal; no masses,  no organomegaly  Extremities: Extremities normal, no edema  Neuro: AAO X 3    Scheduled Meds:   allopurinoL  100 mg Oral Daily    amLODIPine  5 mg Oral Daily    aspirin   325 mg Oral Once    cefTRIAXone (ROCEPHIN) IVPB  1 g Intravenous Q24H    chlorhexidine  15 mL Mouth/Throat BID    fluticasone furoate-vilanteroL  1 puff Inhalation Daily    furosemide (LASIX) IV  40 mg Intravenous Daily    levothyroxine  88 mcg Oral Daily    methylPREDNISolone sodium succinate  40 mg Intravenous Q6H    metoprolol succinate  25 mg Oral Daily    mupirocin   Nasal BID    simvastatin  20 mg Oral QHS    spironolactone  25 mg Oral Daily     Continuous Infusions:  PRN Meds:acetaminophen, acetaminophen, albuterol-ipratropium, calcium chloride IVPB, calcium chloride IVPB, calcium chloride IVPB, magnesium oxide, magnesium sulfate IVPB, magnesium sulfate IVPB, magnesium sulfate IVPB, magnesium sulfate IVPB, melatonin, ondansetron, polyethylene glycol, potassium chloride in water, potassium chloride in water, potassium chloride in water, potassium chloride in water, potassium chloride, potassium chloride, potassium chloride, potassium chloride, sodium chloride 0.9%, sodium phosphate IVPB, sodium phosphate IVPB, sodium phosphate IVPB, sodium phosphate IVPB, sodium phosphate IVPB    LABS AND DIAGNOSTICS     CBC LAST 4 DAYS  Recent Labs   Lab 05/11/20  1220 05/11/20  1801 05/12/20  0009 05/12/20  0347   WBC 9.06  --   --  7.37   RBC 2.71*  --   --  2.70*   HGB 8.8* 9.2* 9.1* 8.9*   HCT 28.4* 29.3* 28.9* 28.5*   *  --   --  106*   MCH 32.5*  --   --  33.0*   MCHC 31.0*  --   --  31.2*   RDW 15.0*  --   --  14.9*   PLT 81*  --   --  100*   MPV 12.7  --   --  11.6   GRAN 91.3*  8.3*  --   --   --    LYMPH 3.9*  0.4*  --   --   --    MONO 4.1  0.4  --   --   --    BASO 0.04  --   --   --    NRBC 0  --   --   --        COAGULATION LAST 4 DAYS  Recent Labs   Lab 05/08/20  1200 05/11/20  1220 05/11/20  1801   LABPT 43.6* 46.9* 44.8*   INR 4.9 5.3* 5.0*       CHEMISTRY LAST 4 DAYS  Recent Labs   Lab 05/11/20  1220 05/11/20  1517 05/11/20  1925 05/12/20  0347     --   --  141   K 4.4  --   --   4.8     --   --  107   CO2 26  --   --  24   ANIONGAP 7*  --   --  10   BUN 37*  --   --  37*   CREATININE 1.5*  --   --  1.4   *  --   --  173*   CALCIUM 9.5  --   --  9.5   PH  --  7.348* 7.436  --    MG 2.3  --   --  2.3   ALBUMIN 3.7  --   --   --    PROT 6.8  --   --   --    ALKPHOS 65  --   --   --    ALT 16  --   --   --    AST 25  --   --   --    BILITOT 1.2*  --   --   --        CARDIAC PROFILE LAST 4 DAYS  Recent Labs   Lab 05/11/20 1220 05/11/20  1801 05/12/20  0347   *  892* 1,222*  --    TROPONINI 0.361* 1.130* 1.201*       ENDOCRINE LAST 4 DAYS  Recent Labs   Lab 05/11/20  1220 05/11/20 1801   PROCAL <0.05 0.07       LAST ARTERIAL BLOOD GAS  ABG  Recent Labs   Lab 05/11/20  1925   PH 7.436   PO2 64*   PCO2 37.6   HCO3 25.3   BE 1       LAST 7 DAYS MICROBIOLOGY   Microbiology Results (last 7 days)     Procedure Component Value Units Date/Time    Blood culture [[id number]] Collected:  05/11/20 1307    Order Status:  Completed Specimen:  Blood from Peripheral, Antecubital, Left Updated:  05/11/20 1958     Blood Culture, Routine No Growth to date    Blood culture [[id number]] Collected:  05/11/20 1313    Order Status:  Completed Specimen:  Blood from Peripheral, Hand, Left Updated:  05/11/20 1958     Blood Culture, Routine No Growth to date          LAST 24 HOUR IMAGING INTERPRETATIONS  No results found in the last 24 hours.    LASTECHO  No results found for this or any previous visit.    CURRENT/PREVIOUS VISIT EKG  Results for orders placed or performed during the hospital encounter of 05/11/20   EKG 12-lead    Collection Time: 05/12/20  5:17 AM    Narrative    Test Reason : I48.91,    Vent. Rate : 084 BPM     Atrial Rate : 065 BPM     P-R Int : 000 ms          QRS Dur : 092 ms      QT Int : 376 ms       P-R-T Axes : 000 038 -14 degrees     QTc Int : 444 ms    Atrial fibrillation with a competing junctional pacemaker  Nonspecific ST and T wave abnormality  Abnormal ECG  When compared  with ECG of 11-MAY-2020 12:03,  Nonspecific T wave abnormality, worse in Anterior leads    Referred By: [email or web address]   SELF           Confirmed By:        VENTILATOR SETTINGS  Oxygen Concentration (%):  [30-50] 30    ASSESSMENT/PLAN:     Active Hospital Problems    Diagnosis    *Acute hypoxemic respiratory failure    COPD (chronic obstructive pulmonary disease)    Acute on chronic heart failure    Symptomatic anemia    Supratherapeutic INR    Permanent atrial fibrillation    Hypertension    Hyperlipidemia    Hypothyroidism    Advanced age    CKD (chronic kidney disease)    Thrombocytopenia    COPD exacerbation    DNR (do not resuscitate)       Assessment & Plan:   1.  Acute respiratory failure  2.  Acute deep I am for cessation of chronic congestive heart failure suspect both systolic and diastolic dysfunction chronic atrial fibrillation  4.  Hypothyroidism  5.  Chronic kidney disease  6.  Thrombocytopenia and  7.  Chronic anemia  8.  Chronic oral anticoagulation therapy  9.  Abnormal troponin secondary to significant hypoxemic event and demand ischemia     RECOMMENDATIONS:  Clinically she is improving continue on her present therapy will review the echocardiogram  Continue on IV steroids, IV antibiotics blood pressure is relatively stable as well as nebulizer treatments.  Will follow the clinical course with you.  From a cardiac standpoint she is relatively stable continue Lasix as well as Aldactone.      [facility]  Department of Cardiology  Darius Be MD  Date of service: 05/12/2020

## 2020-05-12 NOTE — CONSULTS
[facility]  Department of Cardiology  Consult Note      Patient name: Jillian Borrego  MRN: [id number]  Today's Date: 05/12/2020  Admit Date: 5/11/2020                          Consult Requested By: Brenton Dumont MD    SUBJECTIVE     Principal Problem: Acute hypoxemic respiratory failure   This is a late entry patient was seen in the ICU at 7:15 p.m..    Reason for Consult:  Abnormal troponin  History of Present Illness:  89-year-old lady with a history of are chronic congestive heart failure and COPD was admitted with progressive symptoms of shortness of breath and weakness.  She was noted to have a very low saturations tachypneic upon arrival to the emergency room with the saturations around 85 on 5 L of nasal cannula subsequently she was placed on BiPAP machine.  Patient states that she is feeling and breathing better she had she has been placed on BiPAP.  She denies chest discomfort arm neck or jaw pain.  She has history of chronic anemia she is noted to have abnormalities in her troponins and consult called to evaluate the same.  Patient at the present time denies having any cardiac symptoms of arm neck or jaw pain.  Shortness of breath is steadily improving with intervention and treatment.  She also denies nausea belching burping or heartburn.  No melena             HPI: HPI per patient and per ER records  89-year-old female with COPD, chronic heart failure, hyperlipidemia, hypothyroidism, chronic atrial fibrillation on warfarin, CKD, advanced age, DNR comes in for shortness of breath and fall.  Per ER records, patient had at accidental trip and fell and struck her head.  EMS was called and patient appeared short of breath however patient refused to come to the ED.  Patient continued to have shortness of breath during the day and EMS was subsequently called again and brought patient to the ED for further evaluation.  During my interview, patient is short of breath and tachypneic saturating 84% on  5 L nasal cannula.  Able to speak and wanted to worsen sepsis.  Reports living home alone.  Her son and daughter come see her regularly.  Patient was then placed on BiPAP.  Denies fever, chills, chest pain.  Denies hemoptysis, hematuria, melena, hematochezia, gross bleeding.     In the ED, RR 30s, 84% on 5 L, H&H 8.8/28.4 (Baseline 12-13 in 2019), INR 5.3, Cr 1.5 (BL 1.3), , troponin 0.361,  UA with leukocytes and many bacteria, head CT unremarkable, chest x-ray concern for acute heart failure      Review of patient's allergies indicates:   Allergen Reactions    Pcn [penicillins]        Past Medical History:   Diagnosis Date    Afib     Hx of stroke without residual deficits     Hypothyroid     Rheumatoid arthritis      Past Surgical History:   Procedure Laterality Date    RIGHT OOPHORECTOMY      ROTATOR CUFF REPAIR Right      Social History     Tobacco Use    Smoking status: Former Smoker     Packs/day: 1.00     Years: 10.00     Pack years: 10.00     Types: Cigarettes     Last attempt to quit: 1980     Years since quittin.3    Smokeless tobacco: Never Used   Substance Use Topics    Alcohol use: No    Drug use: No        REVIEW OF SYSTEMS  Constitutional: Negative for chills, fatigue and fever.   Eyes: No double vision, No blurred vision  Neuro: No headaches, No dizziness  Respiratory:  Progressive shortness of breath some cough and congestion    Cardiovascular: Negative for chest pain. Negative for palpitations and leg swelling.   Gastrointestinal: Negative for abdominal pain, No melena, diarrhea, nausea and vomiting.   Genitourinary: Negative for dysuria and frequency, Negative for hematuria  Skin: Negative for bruising, Negative for edema or discoloration noted.   Endocrine: Negative for polyphagia, Negative for heat intolerance, Negative for cold intolerance  Psychiatric: Negative for depression, Negative for anxiety, Negative for memory loss  Musculoskeletal: Negative for neck pain,  Negative for muscle weakness, Negative for back pain     OBJECTIVE     Vital Signs (Most Recent)  Temp: 98.6 °F (37 °C) (05/12/20 0701)  Pulse: 79 (05/12/20 0800)  Resp: (!) 26 (05/12/20 0800)  BP: (!) 145/65 (05/12/20 0800)  SpO2: 95 % (05/12/20 0800)    I & O (Last 24H):    Intake/Output Summary (Last 24 hours) at 5/12/2020 0901  Last data filed at 5/12/2020 0600  Gross per 24 hour   Intake 100 ml   Output 700 ml   Net -600 ml       WEIGHTS LAST 4 READINGS  Wt Readings from Last 4 Encounters:   05/12/20 64.5 kg (142 lb 3.2 oz)   05/12/20 64.5 kg (142 lb 3.2 oz)   10/16/17 62.1 kg (137 lb)   01/07/11 71.4 kg (157 lb 7.2 oz)       PHYSICAL EXAM  GENERAL: well built, well nourished, well-developed in no apparent distress alert and oriented.   HEENT: Normocephalic. Pupils normal and conjunctivae normal.  Mucous membranes normal, no cyanosis or icterus, trachea central,no pallor or icterus is noted..   NECK: No JVD. No bruit..   THYROID: Thyroid not enlarged. No nodules present..   CARDIAC: Regular rate and rhythm. S1 is normal.S2 is normal.No gallops, clicks or murmurs noted at this time.  CHEST ANATOMY: normal.   LUNGS:  Markedly diminished breath sounds bilateral rhonchi   ABDOMEN: Soft no masses or organomegaly.  No abdomen pulsations or bruits.  Normal bowel sounds. No pulsations and no masses felt, No guarding or rebound.   URINARY: No flores catheter   EXTREMITIES: No cyanosis, clubbing .  Trace edema noted at this time., no calf tenderness bilaterally.   PERIPHERAL VASCULAR SYSTEM: Good palpable distal pulses.   CENTRAL NERVOUS SYSTEM: No focal motor or sensory deficits noted.   SKIN: Skin without lesions, moist, well perfused.   MUSCLE STRENGTH & TONE: No noteable weakness, atrophy or abnormal movement.     Home Medications:  No current facility-administered medications on file prior to encounter.      Current Outpatient Medications on File Prior to Encounter   Medication Sig Dispense Refill     albuterol-ipratropium (DUO-NEB) 2.5 mg-0.5 mg/3 mL nebulizer solution Take 3 mLs by nebulization every 6 (six) hours as needed for Wheezing. Rescue      allopurinol (ZYLOPRIM) 100 MG tablet Take 100 mg by mouth once daily.       amlodipine (NORVASC) 5 MG tablet Take 5 mg by mouth once daily.       calcitRIOL (ROCALTROL) 0.25 MCG Cap Take 0.25 mcg by mouth once daily.       FUROSEMIDE (LASIX ORAL) Take 40 mg by mouth once daily.       levothyroxine (LEVOXYL) 88 MCG tablet Take 88 mcg by mouth once daily.       metoprolol succinate (TOPROL-XL) 25 MG 24 hr tablet Take 25 mg by mouth once daily.   3    multivitamin (THERAGRAN) per tablet Take 1 tablet by mouth once daily.      simvastatin (ZOCOR) 20 MG tablet Take 20 mg by mouth every evening.       spironolactone (ALDACTONE) 25 MG tablet Take 25 mg by mouth once daily.       TRELEGY ELLIPTA 100-62.5-25 mcg DsDv Inhale 1 puff into the lungs every 4 to 6 hours as needed.       warfarin (COUMADIN) 5 MG tablet Take 5 mg by mouth every Tuesday, Thursday, Saturday, Sunday.      benzonatate (TESSALON) 100 MG capsule TAKE 1-2 CAPSULES BY MOUTH EVERY 8 HOURS AS NEEDED FOR COUGH  0    WARFARIN SODIUM (COUMADIN ORAL) Take 6 mg by mouth every Mon, Wed, Fri.        Scheduled Meds:   albuterol-ipratropium  3 mL Nebulization Q6H WAKE    allopurinoL  100 mg Oral Daily    amLODIPine  5 mg Oral Daily    aspirin  325 mg Oral Once    cefTRIAXone (ROCEPHIN) IVPB  1 g Intravenous Q24H    chlorhexidine  15 mL Mouth/Throat BID    fluticasone furoate-vilanteroL  1 puff Inhalation Daily    furosemide (LASIX) IV  40 mg Intravenous Daily    levothyroxine  88 mcg Oral Daily    methylPREDNISolone sodium succinate  40 mg Intravenous Q6H    metoprolol succinate  25 mg Oral Daily    mupirocin   Nasal BID    simvastatin  20 mg Oral QHS    spironolactone  25 mg Oral Daily     Continuous Infusions:  PRN Meds:acetaminophen, acetaminophen, albuterol-ipratropium, calcium chloride  IVPB, calcium chloride IVPB, calcium chloride IVPB, magnesium oxide, magnesium sulfate IVPB, magnesium sulfate IVPB, magnesium sulfate IVPB, magnesium sulfate IVPB, melatonin, ondansetron, polyethylene glycol, potassium chloride in water, potassium chloride in water, potassium chloride in water, potassium chloride in water, potassium chloride, potassium chloride, potassium chloride, potassium chloride, sodium chloride 0.9%, sodium phosphate IVPB, sodium phosphate IVPB, sodium phosphate IVPB, sodium phosphate IVPB, sodium phosphate IVPB    LABS AND DIAGNOSTICS     CBC LAST 4 DAYS  Recent Labs   Lab 05/11/20 1220 05/11/20  1801 05/12/20  0009 05/12/20  0347   WBC 9.06  --   --  7.37   RBC 2.71*  --   --  2.70*   HGB 8.8* 9.2* 9.1* 8.9*   HCT 28.4* 29.3* 28.9* 28.5*   *  --   --  106*   MCH 32.5*  --   --  33.0*   MCHC 31.0*  --   --  31.2*   RDW 15.0*  --   --  14.9*   PLT 81*  --   --  100*   MPV 12.7  --   --  11.6   GRAN 91.3*  8.3*  --   --   --    LYMPH 3.9*  0.4*  --   --   --    MONO 4.1  0.4  --   --   --    BASO 0.04  --   --   --    NRBC 0  --   --   --        COAGULATION LAST 4 DAYS  Recent Labs   Lab 05/08/20  1200 05/11/20  1220 05/11/20  1801   LABPT 43.6* 46.9* 44.8*   INR 4.9 5.3* 5.0*       CHEMISTRY LAST 4 DAYS  Recent Labs   Lab 05/11/20  1220 05/11/20  1517 05/11/20  1925 05/12/20  0347     --   --  141   K 4.4  --   --  4.8     --   --  107   CO2 26  --   --  24   ANIONGAP 7*  --   --  10   BUN 37*  --   --  37*   CREATININE 1.5*  --   --  1.4   *  --   --  173*   CALCIUM 9.5  --   --  9.5   PH  --  7.348* 7.436  --    MG 2.3  --   --  2.3   ALBUMIN 3.7  --   --   --    PROT 6.8  --   --   --    ALKPHOS 65  --   --   --    ALT 16  --   --   --    AST 25  --   --   --    BILITOT 1.2*  --   --   --        CARDIAC PROFILE LAST 4 DAYS  Recent Labs   Lab 05/11/20  1220 05/11/20  1801 05/12/20  0347   *  892* 1,222*  --    TROPONINI 0.361* 1.130* 1.201*        ENDOCRINE LAST 4 DAYS  Recent Labs   Lab 05/11/20  1220 05/11/20  1801   PROCAL <0.05 0.07       LAST ARTERIAL BLOOD GAS  ABG  Recent Labs   Lab 05/11/20  1925   PH 7.436   PO2 64*   PCO2 37.6   HCO3 25.3   BE 1       LAST 7 DAYS MICROBIOLOGY   Microbiology Results (last 7 days)     Procedure Component Value Units Date/Time    Blood culture [[id number]] Collected:  05/11/20 1307    Order Status:  Completed Specimen:  Blood from Peripheral, Antecubital, Left Updated:  05/11/20 1958     Blood Culture, Routine No Growth to date    Blood culture [[id number]] Collected:  05/11/20 1313    Order Status:  Completed Specimen:  Blood from Peripheral, Hand, Left Updated:  05/11/20 1958     Blood Culture, Routine No Growth to date          LAST 24 HOUR IMAGING INTERPRETATIONS  No results found in the last 24 hours.    ECHOCARDIOGRAM RESULTS (last 10)  No results found for this or any previous visit.    CURRENT/PREVIOUS VISIT EKG  Results for orders placed or performed during the hospital encounter of 05/11/20   EKG 12-lead    Collection Time: 05/12/20  5:17 AM    Narrative    Test Reason : I48.91,    Vent. Rate : 084 BPM     Atrial Rate : 065 BPM     P-R Int : 000 ms          QRS Dur : 092 ms      QT Int : 376 ms       P-R-T Axes : 000 038 -14 degrees     QTc Int : 444 ms    Atrial fibrillation with a competing junctional pacemaker  Nonspecific ST and T wave abnormality  Abnormal ECG  When compared with ECG of 11-MAY-2020 12:03,  Nonspecific T wave abnormality, worse in Anterior leads    Referred By: [email or web address]   SELF           Confirmed By:          Oxygen Concentration (%):  [30-50] 30      ASSESSMENT/PLAN:   1.  Acute respiratory failure  2.  Acute deep I am for cessation of chronic congestive heart failure suspect both systolic and diastolic dysfunction chronic atrial fibrillation  4.  Hypothyroidism  5.  Chronic kidney disease  6.  Thrombocytopenia and  7.  Chronic anemia  8.  Chronic oral anticoagulation therapy  9.   Abnormal troponin secondary to significant hypoxemic event and demand ischemia    RECOMMENDATIONS:  Agree with the plans to maintain on supportive measures.  She has responded fairly well to initial therapy.  Her chest x-ray is consistent with diffuse ground-glass appearance as well as left pleural effusion.  Recommend to obtain echocardiogram for LV function assessment.  Her BNP remains elevated at 1222 if there is evidence of LV dysfunction may consider dobutamine inotropic therapy to augment her systolic function.    Continue her present therapy with amlodipine 5 mg a day  Simvastatin 20 mg daily  Furosemide injectable are at 40 mg b.i.d..  Consider changing aspirin to 81 mg avoid high doses   and high PT /INR  Patient remains critically ill    Thank you for this consultation                      Active Hospital Problems    Diagnosis    *Acute hypoxemic respiratory failure    COPD (chronic obstructive pulmonary disease)    Acute on chronic heart failure    Symptomatic anemia    Supratherapeutic INR    Permanent atrial fibrillation    Hypertension    Hyperlipidemia    Hypothyroidism    Advanced age    CKD (chronic kidney disease)    Thrombocytopenia    COPD exacerbation    DNR (do not resuscitate)             [facility]  Department of Cardiology  Darius Be MD  Date of service: 05/12/2020

## 2020-05-12 NOTE — CONSULTS
GASTROENTEROLOGY INPATIENT CONSULT NOTE  Patient Name: Jillian Borrego  Patient MRN: [id number]  Patient : 3/5/1925    Admit Date: 2020  Service date: 2020    Reason for Consult: anemia on anticoagulation    PCP: Primary Doctor No    Chief Complaint   Patient presents with    Shortness of Breath     fall this morning       HPI: Patient is a 89 y.o. female with PMHx COPD, CHF/Afib/CVA (Coumadin), hypothyroid, gout, arthritis, HTN presents for evaluation of weakness / SOB. Acute / chronic, intermittent, progressive over past couple days. Had fall w/ SOB prior to admission. No signs of GIB, NSAID use or recent endoscopy. Still with mild SOB at rest but improved per patient.     CHART REVIEW:   Hg 8.9 (unchanged from 3/'20; baseline 11); INR 5.0; Trop 1.2; COVID negative    Past Medical History:  Past Medical History:   Diagnosis Date    Afib     Hx of stroke without residual deficits     Hypothyroid     Rheumatoid arthritis         Past Surgical History:  Past Surgical History:   Procedure Laterality Date    RIGHT OOPHORECTOMY      ROTATOR CUFF REPAIR Right         Home Medications:  Medications Prior to Admission   Medication Sig Dispense Refill Last Dose    albuterol-ipratropium (DUO-NEB) 2.5 mg-0.5 mg/3 mL nebulizer solution Take 3 mLs by nebulization every 6 (six) hours as needed for Wheezing. Rescue   2020 at Unknown time    allopurinol (ZYLOPRIM) 100 MG tablet Take 100 mg by mouth once daily.    2020 at Unknown time    amlodipine (NORVASC) 5 MG tablet Take 5 mg by mouth once daily.    5/10/2020 at Unknown time    calcitRIOL (ROCALTROL) 0.25 MCG Cap Take 0.25 mcg by mouth once daily.    2020 at Unknown time    FUROSEMIDE (LASIX ORAL) Take 40 mg by mouth once daily.    2020 at Unknown time    levothyroxine (LEVOXYL) 88 MCG tablet Take 88 mcg by mouth once daily.    2020 at Unknown time    metoprolol succinate (TOPROL-XL) 25 MG 24 hr tablet Take 25 mg by mouth once  daily.   3 5/11/2020 at Unknown time    multivitamin (THERAGRAN) per tablet Take 1 tablet by mouth once daily.   5/11/2020 at Unknown time    simvastatin (ZOCOR) 20 MG tablet Take 20 mg by mouth every evening.    5/10/2020 at Unknown time    spironolactone (ALDACTONE) 25 MG tablet Take 25 mg by mouth once daily.    5/11/2020 at Unknown time    TRELEGY ELLIPTA 100-62.5-25 mcg DsDv Inhale 1 puff into the lungs every 4 to 6 hours as needed.    5/10/2020 at Unknown time    warfarin (COUMADIN) 5 MG tablet Take 5 mg by mouth every Tuesday, Thursday, Saturday, Sunday.   5/10/2020    benzonatate (TESSALON) 100 MG capsule TAKE 1-2 CAPSULES BY MOUTH EVERY 8 HOURS AS NEEDED FOR COUGH  0 Taking    WARFARIN SODIUM (COUMADIN ORAL) Take 6 mg by mouth every Mon, Wed, Fri.    5/8/2020       Inpatient Medications:   albuterol-ipratropium  3 mL Nebulization Q6H WAKE    allopurinoL  100 mg Oral Daily    amLODIPine  5 mg Oral Daily    aspirin  325 mg Oral Once    cefTRIAXone (ROCEPHIN) IVPB  1 g Intravenous Q24H    fluticasone furoate-vilanteroL  1 puff Inhalation Daily    furosemide (LASIX) IV  40 mg Intravenous Daily    levothyroxine  88 mcg Oral Daily    methylPREDNISolone sodium succinate  40 mg Intravenous Q6H    metoprolol succinate  25 mg Oral Daily    simvastatin  20 mg Oral QHS    spironolactone  25 mg Oral Daily     acetaminophen, acetaminophen, albuterol-ipratropium, calcium chloride IVPB, calcium chloride IVPB, calcium chloride IVPB, magnesium oxide, magnesium sulfate IVPB, magnesium sulfate IVPB, magnesium sulfate IVPB, magnesium sulfate IVPB, melatonin, ondansetron, polyethylene glycol, potassium chloride in water, potassium chloride in water, potassium chloride in water, potassium chloride in water, potassium chloride, potassium chloride, potassium chloride, potassium chloride, sodium chloride 0.9%, sodium phosphate IVPB, sodium phosphate IVPB, sodium phosphate IVPB, sodium phosphate IVPB, sodium  "phosphate IVPB    Review of patient's allergies indicates:   Allergen Reactions    Pcn [penicillins]        Social History:   Social History     Occupational History    Not on file   Tobacco Use    Smoking status: Former Smoker     Packs/day: 1.00     Years: 10.00     Pack years: 10.00     Types: Cigarettes     Last attempt to quit: 1980     Years since quittin.3    Smokeless tobacco: Never Used   Substance and Sexual Activity    Alcohol use: No    Drug use: No    Sexual activity: Not on file       Family History:   Family History   Problem Relation Age of Onset    Heart disease Mother     Heart disease Father        Review of Systems:  A 10 point review of systems was performed and was normal, except as mentioned in the HPI, including constitutional, HEENT, heme, lymph, cardiovascular, respiratory, gastrointestinal, genitourinary, neurologic, endocrine, psychiatric and musculoskeletal.      OBJECTIVE:    Physical Exam:  24 Hour Vital Sign Ranges: Temp:  [97.5 °F (36.4 °C)-98.9 °F (37.2 °C)] 97.7 °F (36.5 °C)  Pulse:  [69-97] 74  Resp:  [20-47] 20  SpO2:  [85 %-100 %] 95 %  BP: (121-178)/(57-93) 146/65  Most recent vitals: BP (!) 146/65   Pulse 74   Temp 97.7 °F (36.5 °C) (Oral)   Resp 20   Ht 5' 4" (1.626 m)   Wt 64.5 kg (142 lb 3.2 oz)   LMP  (Within Years)   SpO2 95%   Breastfeeding? No   BMI 24.41 kg/m²    GEN: well-developed, well-nourished, awake and alert, non-toxic appearing elderly WF  HEENT: PERRL, sclera anicteric, oral mucosa pink and moist without lesion  NECK: trachea midline; Good ROM  CV: irregular rate and rhythm  RESP: min coarse; moving air  ABD: soft, non-tender, non-distended, normal bowel sounds  EXT: no swelling or edema, 1+ pulses distally  SKIN: no rashes or jaundice  PSYCH: normal affect    Labs:   Recent Labs     20  1220 20  0347   WBC 9.06 7.37   * 106*   PLT 81* 100*     Recent Labs     20  1220 20  0347    141   K 4.4 4.8   CL " 109 107   CO2 26 24   BUN 37* 37*   * 173*     No results for input(s): ALB in the last 72 hours.    Invalid input(s): ALKP, SGOT, SGPT, TBIL, DBIL, TPRO  Recent Labs     05/11/20  1220 05/11/20  1801   INR 5.3* 5.0*         Radiology Review:  CT Head Without Contrast   Final Result      X-Ray Chest AP Portable   Final Result            IMPRESSION / RECOMMENDATIONS:  89 y.o. female with PMHx COPD, CHF/Afib/CVA (Coumadin), hypothyroid, gout, arthritis, HTN, CKD presents for evaluation of weakness / SOB w/ anemia. Macrocytic anemia w/o any changes relative to 3/'20 not c/w GIB loss despite anticoagulation w/ INR 5ish. Risks, benefits, alternative discussed in detail with patient / family regarding anticipated procedure and possible complications and prefer conservative monitoring for now given stable Hg, various co-morbidities and risk of sedation.     -Conservative for now as no signs of GIB  -Revisit endoscopy if concerns for GIB and resolution of SOB .       Thank you for this consult.    Ciro Villalta III  5/12/2020  7:23 AM

## 2020-05-12 NOTE — PT/OT/SLP EVAL
Speech Language Pathology Evaluation  Bedside Swallow    Patient Name:  Jillian Borrego   MRN:  [id number]  Admitting Diagnosis: Acute hypoxemic respiratory failure    Recommendations:                 General Recommendations:  Consider Otolaryngology consult to address onset of dysphonia ~2 months ago   Diet recommendations:  Regular, Thin   Aspiration Precautions: Standard aspiration precautions   General Precautions: Standard,    Communication strategies:  none    History:     Past Medical History:   Diagnosis Date    Afib     Hx of stroke without residual deficits     Hypothyroid     Rheumatoid arthritis        Past Surgical History:   Procedure Laterality Date    RIGHT OOPHORECTOMY      ROTATOR CUFF REPAIR Right        Prior Intubation HX:  None; placed on BiPAP upon arrival.    Modified Barium Swallow: none    Imaging Results          CT Head Without Contrast (Final result)  Result time 05/11/20 14:29:01    Final result by Selene Bermudez MD (05/11/20 14:29:01)                 Narrative:    CMS MANDATED QUALITY DATA - CT RADIATION  436    All CT scans at this facility utilize dose modulation, iterative  reconstruction, and/or weight based dosing when appropriate to reduce  radiation dose to as low as reasonably achievable.  CT head without contrast    Clinical data: Head trauma    FINDINGS: Noninfusion images were obtained from the skull base to the  vertex. There is no intracranial mass, hemorrhage, or midline shift.  Ventricles and sulci are mildly prominent. There are no pathologic  extra-axial fluid collections. There is no evidence of cortical  ischemic change. Changes of mild chronic microvascular white matter  disease are noted. Cerebellum and brainstem are normal. The calvarium  is intact.    IMPRESSION:    1. No acute intracranial abnormalities. Chronic  findings as discussed above.    Electronically Signed by Selene Bermudez M.D. on 5/11/2020 2:32 PM                             X-Ray Chest AP  "Portable (Final result)  Result time 05/11/20 12:33:43    Final result by Horacio Hough MD (05/11/20 12:33:43)                 Narrative:    Chest single view    CLINICAL DATA: Shortness of breath    FINDINGS: AP view is compared to December 2019.    The heart is mildly enlarged. Aortic arch is calcified.    There is mild diffuse interstitial prominence, with faint bilateral  groundglass opacities, most pronounced at the right lung base. There  are also probable tiny bilateral pleural effusions. Findings may be on  the basis of CHF/volume overload with pulmonary edema, although  bilateral pneumonia is also possible.    No acute osseous abnormalities are identified.    IMPRESSION:  1. Mild diffuse interstitial prominence with bilateral groundglass  opacities, most pronounced at the right lung base, and probable tiny  bilateral pleural effusions. Findings may be on the basis of  congestive failure/volume overload with pulmonary edema, although  pneumonia is also possible given this appearance.    Electronically Signed by Paul Hough M.D. on 5/11/2020 12:44 PM                                Prior diet: unrestricted.    Subjective     "I was wondering if you would come back"  Patient goals: none stated      Pain/Comfort:  Pain Rating 1: 0/10    Objective:     Pt with dysphonia in addition to short utterance length appearing 2/2 poor breath support. Sustained phonation time of 8 seconds w/ loss of phonation. Dysphonia c/b strained and hoarse vocal quality. Daughter reports onset ~2 months ago with reduced intelligibility to familiar listeners.     Oral Musculature Evaluation  Oral Musculature: WFL  Dentition: present and adequate  Mandibular Strength and Mobility: WFL  Oral Labial Strength and Mobility: WFL  Lingual Strength and Mobility: WFL  Velar Elevation: WFL  Volitional Cough: Present  Volitional Swallow: Hyolaryngeal movement present to digital palpation  Voice Prior to PO Intake: clear; strained and " hoarse. Daughter reports onset ~2 mo ago     Bedside Swallow Eval:   Consistencies Assessed:  · Thin liquids; ice chip x2, tsp x2, cup edge and 3oz via sequential cup sips  · Solids 1/4 cracker x2     Oral Phase:   · Pt with adequate oral containment and coordination across consistencies. Timely mastication, adequate bolus formation and oral clearance which resulted in no evidence of appreciable oral residue with solid consistencies.    Pharyngeal Phase:   · Adequate hyolaryngeal movement to digital palpation. Pt tolerated 3oz thin liquid via sequential sips with no overt s/s of aspiration or changes in vocal quality, passing 3oz water challenge. Therefore, risk of aspiration not indicated. Multiple swallows not observed across consistencies trialled.     Compensatory Strategies  · None       Assessment:     Jillian Borrego is a 89 y.o. female with an SLP diagnosis of Dysphonia and no s/s of oropharyngeal dysphagia this date. Increased risk for aspiration secondary to advanced age and compromised respiratory status. Will follow peripherally to monitor and provide education w/ diagnostic as needed.     Goals:   Multidisciplinary Problems     SLP Goals        Problem: SLP Goal    Goal Priority Disciplines Outcome   SLP Goal     SLP    Description:  1. Pt will tolerate least restrictive PO diet without acute dysphagia pulmonary complication.   2. PENDING: Pt will participate in VFSS to define swallow physiology; treatment goal to follow                     Plan:     · Plan of Care reviewed with:  patient   · SLP Follow-Up:           Time Tracking:     SLP Treatment Date:   05/12/20  Speech Start Time:  1330  Speech Stop Time:  1350     Speech Total Time (min):  20 min    Billable Minutes: Eval Swallow and Oral Function 20    Jayce Dunn CCC-SLP  05/12/2020

## 2020-05-12 NOTE — HOSPITAL COURSE
"05/12 Discussed patient with Intensivist: stable for transfer to Cardiology unit. Patient feels "Better". Is able to speak in short sentences. Labs reviewed INR = 5.0; Stage 3 CKI    05/13 Labs reviewed INR = 7.7. Received vitamin K. Feels "Good". More alert today. Breathing more easily    05/14 Labs reviewed: No INR this AM, have ordered "Stat"; H/H has fallen again. 1 unit pRBC ordered, consent obtained. Patient denies CP, but continues to have dyspnea.     05/15  Nursing states "Dark" stool this AM. Order for FOB is in. Lab reviewed: H/H good after transfusion; INR 1.7 Patient states she feels "Good". Patient's sister is in town to be with her when discharged (reportedly the sister is a "Good" 70 year old).    05/16 Patient's blood count has increased. No blood pr. Patient feels "Good" and would like to be discharged home with her sister.   VSS  Lungs: decreased entry without adventitious sounds  Heart: S1S2 irreg irreg  Abdo: soft  "

## 2020-05-12 NOTE — NURSING TRANSFER
Nursing Transfer Note      5/12/2020     Transfer To: 2503    Transfer via bed    Transfer with O2, cardiac monitoring    Transported by FAVIO Hernandez    Medicines sent: yes    Chart send with patient: yes    Notified: Daughter at bedside and daughter contacted pt's sister    Patient reassessed at: 5/12/20 @1355    Upon arrival to floor: cardiac monitor applied, patient oriented to room, call bell in reach and bed in lowest position, nurse aware of pt's transfer

## 2020-05-12 NOTE — PLAN OF CARE
05/11/20 1927   Patient Assessment/Suction   Level of Consciousness (AVPU) alert   Respiratory Effort Normal;Unlabored   Expansion/Accessory Muscles/Retractions expansion symmetric;no retractions;no use of accessory muscles   All Lung Fields Breath Sounds diminished;clear;equal bilaterally   PRE-TX-O2   O2 Device (Oxygen Therapy) BiPAP   Oxygen Concentration (%) 30   SpO2 (!) 93 %   Pulse Oximetry Type Continuous   $ Pulse Oximetry - Multiple Charge Pulse Oximetry - Multiple   Pulse 85   Resp (!) 28   Aerosol Therapy   $ Aerosol Therapy Charges Aerosol Treatment   Daily Review of Necessity (SVN) completed   Respiratory Treatment Status (SVN) given   Treatment Route (SVN) in-line   Patient Position (SVN) semi-Jones's   Post Treatment Assessment (SVN) breath sounds unchanged   Signs of Intolerance (SVN) none   Breath Sounds Post-Respiratory Treatment   Post-treatment Heart Rate (beats/min) 85   Post-treatment Resp Rate (breaths/min) 28   Preset CPAP/BiPAP Settings   Mode Of Delivery BiPAP   $ Is patient using? Yes   Sized Appropriately? Yes   Equipment Type V60   Airway Device Type small full face mask   Humidifier not applicable   Ipap 10   EPAP (cm H2O) 5   Pressure Support (cm H2O) 5   Set Rate (Breaths/Min) 16   ITime (sec) 1   Rise Time (sec) 3   Patient CPAP/BiPAP Settings   Low (L/Min) 3   Timed Inspiration (Sec) 1   RR Total (Breaths/Min) 28   Tidal Volume (mL) 554   VE Minute Ventilation (L/min) 14.8 L/min   Peak Inspiratory Pressure (cm H2O) 19   TiTOT (%) 48   Total Leak (L/Min) 9   Patient Trigger - ST Mode Only (%) 98   CPAP/BiPAP Alarms   High Pressure (cm H2O) 40   Low Pressure (cm H2O) 10   Low Pressure Delay (Sec) 10   Minute Ventilation (L/Min) 3   High RR (breaths/min) 40   Low RR (breaths/min) 10   Apnea (Sec) 20   Respiratory Evaluation   $ Care Plan Tech Time 15 min   Evaluation For   (careplan)

## 2020-05-12 NOTE — PLAN OF CARE
05/12/20 1503   Discharge Assessment   Assessment Type Discharge Planning Assessment   Confirmed/corrected address and phone number on facesheet? Yes   Assessment information obtained from? Patient;Caregiver  (Daughter Mrs Hortencia Jarrell cell [phone] and pt's sister and POA Mrs Jacobo cell [phone])   Expected Length of Stay (days) 1   Communicated expected length of stay with patient/caregiver yes   Prior to hospitilization cognitive status: Alert/Oriented  (with some Alzheimers per daughter)   Prior to hospitalization functional status: Assistive Equipment;Partially Dependent   Current cognitive status: Alert/Oriented   Current Functional Status: Assistive Equipment;Partially Dependent   Facility Arrived From: Home   Lives With alone   Able to Return to Prior Arrangements yes   Is patient able to care for self after discharge? Yes  (Yes per patient but the daughter and this CM disagree and she will need more care at night, her sister mrs Jacobo will be staying in town with pt for a few wks per mrs Garrido dtr)   Who are your caregiver(s) and their phone number(s)? Dtr Mrs Hortencia Jarrell cell [phone] and Mrs Jacobo sister and KIM cell [phone] as above   Patient's perception of discharge disposition home or selfcare  (with family)   Patient currently being followed by outpatient case management? No   Patient currently receives any other outside agency services? No   Equipment Currently Used at Home wheelchair;walker, rolling;rollator;cane, quad;cane, straight;shower chair   Part D Coverage Medicare A and B with BCBS Secondary   Do you have any problems affording any of your prescribed medications? No   Is the patient taking medications as prescribed? yes   Does the patient have transportation home? Yes   Transportation Anticipated family or friend will provide   Dialysis Name and Scheduled days Used to have [facility] on [address], stopped about 2 yrs ago, got better per Daughter   Does the  patient receive services at the [facility]? Yes  (With Periodic Appts through Dr Rebolledo per daughter)   Discharge Plan A Home with family   Discharge Plan B New Nursing Home placement - [facility] care facility  (Daughter said pt was there before and will call Mrs [facility] to see about getting her there after her aunt Odalis getsd in town tomorrow)   DME Needed Upon Discharge  none   Patient/Family in Agreement with Plan yes     Introduced self to patient and her daughter Mrs Garrido in room and asked if ok to take a few min of their time for short interview for d/c planning, and ok with them both to complete assessment.

## 2020-05-12 NOTE — PLAN OF CARE
05/12/20 1523   Discharge Assessment   Assessment Type Discharge Planning Reassessment     Also spoke with patient and her daughter mrs Garrido about the Important Message for Medicare, explained to patient and family that they have the right to appeal their discharge from the facility, and reviewed the form with  patient and family, they verbalized an understanding, pt's daughter Mrs Garrido signed form that they rec'd the information from the Medicare Notice, and form scanned into chart.

## 2020-05-12 NOTE — PROGRESS NOTES
"[facility] Medicine  Progress Note    Patient Name: Jillian Borrego  MRN: [id number]  Patient Class: IP- Inpatient   Admission Date: 5/11/2020  Length of Stay: 1 days  Attending Physician: Brenton Dumont MD  Primary Care Provider: Primary Doctor No        Subjective:     Principal Problem:Acute hypoxemic respiratory failure        HPI:  HPI per patient and per ER records  89-year-old female with COPD, chronic heart failure, hyperlipidemia, hypothyroidism, chronic atrial fibrillation on warfarin, CKD, advanced age, DNR comes in for shortness of breath and fall.  Per ER records, patient had at accidental trip and fell and struck her head.  EMS was called and patient appeared short of breath however patient refused to come to the ED.  Patient continued to have shortness of breath during the day and EMS was subsequently called again and brought patient to the ED for further evaluation.  During my interview, patient is short of breath and tachypneic saturating 84% on 5 L nasal cannula.  Able to speak and wanted to worsen sepsis.  Reports living home alone.  Her son and daughter come see her regularly.  Patient was then placed on BiPAP.  Denies fever, chills, chest pain.  Denies hemoptysis, hematuria, melena, hematochezia, gross bleeding.    In the ED, RR 30s, 84% on 5 L, H&H 8.8/28.4 (Baseline 12-13 in 2019), INR 5.3, Cr 1.5 (BL 1.3), , troponin 0.361,  UA with leukocytes and many bacteria, head CT unremarkable, chest x-ray concern for acute heart failure.    Overview/Hospital Course:  05/12 Discussed patient with Intensivist: stable for transfer to Cardiology unit. Patient feels "Better". Is able to speak in short sentences. Labs reviewed INR = 5.0; Stage 3 CKI    Interval History: stable for transfer to Cardiology Unit    Review of Systems   Constitutional: Negative.    HENT: Negative.    Eyes: Negative.    Respiratory: Positive for shortness of breath.    Cardiovascular: Negative.  "   Gastrointestinal: Negative.    Endocrine: Negative.    Genitourinary: Negative.    Musculoskeletal: Negative.    Skin: Negative.    Allergic/Immunologic: Negative.    Neurological: Negative.    Hematological: Negative.    All other systems reviewed and are negative.    Objective:     Vital Signs (Most Recent):  Temp: 98.6 °F (37 °C) (05/12/20 0701)  Pulse: 79 (05/12/20 0800)  Resp: (!) 26 (05/12/20 0800)  BP: (!) 145/65 (05/12/20 0800)  SpO2: 95 % (05/12/20 0800) Vital Signs (24h Range):  Temp:  [97.7 °F (36.5 °C)-98.9 °F (37.2 °C)] 98.6 °F (37 °C)  Pulse:  [67-97] 79  Resp:  [20-47] 26  SpO2:  [85 %-98 %] 95 %  BP: (121-178)/(57-93) 145/65     Weight: 64.5 kg (142 lb 3.2 oz)  Body mass index is 24.41 kg/m².    Intake/Output Summary (Last 24 hours) at 5/12/2020 1231  Last data filed at 5/12/2020 0825  Gross per 24 hour   Intake 100 ml   Output 900 ml   Net -800 ml      Physical Exam   Constitutional: She is oriented to person, place, and time.   Frail    HENT:   Head: Normocephalic and atraumatic.   Eyes: Pupils are equal, round, and reactive to light. Conjunctivae and EOM are normal.   Neck: Normal range of motion. Neck supple.   Cardiovascular: Normal rate, normal heart sounds and intact distal pulses.   irreg irreg   Pulmonary/Chest: Effort normal and breath sounds normal.   Decreased entry bases without adventitious sounds   Abdominal: Soft. Bowel sounds are normal.   Musculoskeletal: Normal range of motion.   Neurological: She is alert and oriented to person, place, and time.   Skin: Skin is warm and dry. Capillary refill takes less than 2 seconds.   Psychiatric: She has a normal mood and affect. Her behavior is normal. Judgment and thought content normal.   Nursing note and vitals reviewed.      Significant Labs:   BMP:   Recent Labs   Lab 05/12/20  0347   *      K 4.8      CO2 24   BUN 37*   CREATININE 1.4   CALCIUM 9.5   MG 2.3     CBC:   Recent Labs   Lab 05/11/20  1220 05/11/20  8942  05/12/20  0009 05/12/20  0347   WBC 9.06  --   --  7.37   HGB 8.8* 9.2* 9.1* 8.9*   HCT 28.4* 29.3* 28.9* 28.5*   PLT 81*  --   --  100*     Cardiac Markers:   Recent Labs   Lab 05/11/20  1801   BNP 1,222*     Troponin:   Recent Labs   Lab 05/11/20  1220 05/11/20  1801 05/12/20  0347   TROPONINI 0.361* 1.130* 1.201*       Significant Imaging: I have reviewed and interpreted all pertinent imaging results/findings within the past 24 hours.      Assessment/Plan:      * Acute hypoxemic respiratory failure  Continue current regimen  DNR    DNR (do not resuscitate)        COPD exacerbation  Continue current regimen      Thrombocytopenia  Continue current regimen      CKD (chronic kidney disease)  Continue current regimen      Advanced age        Hypothyroidism  Continue current regimen      Hyperlipidemia    Continue current regimen    Hypertension  Continue current regimen      Permanent atrial fibrillation  Continue current regimen, except hold warfrin      Supratherapeutic INR  Continue hold warfarin      Symptomatic anemia  Stable       Acute on chronic heart failure  Continue current regimen; check ECHO      COPD (chronic obstructive pulmonary disease)  Continue supplemental oxygen        VTE Risk Mitigation (From admission, onward)         Ordered     IP VTE HIGH RISK PATIENT  Once      05/11/20 1503     Place sequential compression device  Until discontinued      05/11/20 1503                      Brenton Dumont MD  Department of Hospital Medicine   [facility]

## 2020-05-12 NOTE — PLAN OF CARE
Dysphonia and no s/s of oropharyngeal dysphagia this date. Increased risk for aspiration secondary to advanced age and compromised respiratory status. Will follow peripherally to monitor and provide education w/ diagnostic as needed.     Problem: SLP Goal  Goal: SLP Goal  Description  1. Pt will tolerate least restrictive PO diet without acute dysphagia pulmonary complication.   2. PENDING: Pt will participate in VFSS to define swallow physiology; treatment goal to follow    Outcome: Ongoing, Progressing

## 2020-05-12 NOTE — SUBJECTIVE & OBJECTIVE
Interval History: stable for transfer to Cardiology Unit    Review of Systems   Constitutional: Negative.    HENT: Negative.    Eyes: Negative.    Respiratory: Positive for shortness of breath.    Cardiovascular: Negative.    Gastrointestinal: Negative.    Endocrine: Negative.    Genitourinary: Negative.    Musculoskeletal: Negative.    Skin: Negative.    Allergic/Immunologic: Negative.    Neurological: Negative.    Hematological: Negative.    All other systems reviewed and are negative.    Objective:     Vital Signs (Most Recent):  Temp: 98.6 °F (37 °C) (05/12/20 0701)  Pulse: 79 (05/12/20 0800)  Resp: (!) 26 (05/12/20 0800)  BP: (!) 145/65 (05/12/20 0800)  SpO2: 95 % (05/12/20 0800) Vital Signs (24h Range):  Temp:  [97.7 °F (36.5 °C)-98.9 °F (37.2 °C)] 98.6 °F (37 °C)  Pulse:  [67-97] 79  Resp:  [20-47] 26  SpO2:  [85 %-98 %] 95 %  BP: (121-178)/(57-93) 145/65     Weight: 64.5 kg (142 lb 3.2 oz)  Body mass index is 24.41 kg/m².    Intake/Output Summary (Last 24 hours) at 5/12/2020 1231  Last data filed at 5/12/2020 0825  Gross per 24 hour   Intake 100 ml   Output 900 ml   Net -800 ml      Physical Exam   Constitutional: She is oriented to person, place, and time.   Frail    HENT:   Head: Normocephalic and atraumatic.   Eyes: Pupils are equal, round, and reactive to light. Conjunctivae and EOM are normal.   Neck: Normal range of motion. Neck supple.   Cardiovascular: Normal rate, normal heart sounds and intact distal pulses.   irreg irreg   Pulmonary/Chest: Effort normal and breath sounds normal.   Decreased entry bases without adventitious sounds   Abdominal: Soft. Bowel sounds are normal.   Musculoskeletal: Normal range of motion.   Neurological: She is alert and oriented to person, place, and time.   Skin: Skin is warm and dry. Capillary refill takes less than 2 seconds.   Psychiatric: She has a normal mood and affect. Her behavior is normal. Judgment and thought content normal.   Nursing note and vitals  reviewed.      Significant Labs:   BMP:   Recent Labs   Lab 05/12/20  0347   *      K 4.8      CO2 24   BUN 37*   CREATININE 1.4   CALCIUM 9.5   MG 2.3     CBC:   Recent Labs   Lab 05/11/20 1220 05/11/20  1801 05/12/20  0009 05/12/20 0347   WBC 9.06  --   --  7.37   HGB 8.8* 9.2* 9.1* 8.9*   HCT 28.4* 29.3* 28.9* 28.5*   PLT 81*  --   --  100*     Cardiac Markers:   Recent Labs   Lab 05/11/20 1801   BNP 1,222*     Troponin:   Recent Labs   Lab 05/11/20 1220 05/11/20 1801 05/12/20 0347   TROPONINI 0.361* 1.130* 1.201*       Significant Imaging: I have reviewed and interpreted all pertinent imaging results/findings within the past 24 hours.

## 2020-05-12 NOTE — PLAN OF CARE
05/12/20 0708   Patient Assessment/Suction   Level of Consciousness (AVPU) alert   Respiratory Effort Normal;Unlabored   Expansion/Accessory Muscles/Retractions no use of accessory muscles;no retractions   All Lung Fields Breath Sounds diminished   PRE-TX-O2   O2 Device (Oxygen Therapy) nasal cannula   $ Is the patient on Low Flow Oxygen? Yes   Flow (L/min) 4   SpO2 95 %   Pulse Oximetry Type Continuous   $ Pulse Oximetry - Multiple Charge Pulse Oximetry - Multiple   Pulse 74   Resp 20   Aerosol Therapy   $ Aerosol Therapy Charges Aerosol Treatment   Daily Review of Necessity (SVN) completed   Respiratory Treatment Status (SVN) given   Treatment Route (SVN) mask;oxygen   Patient Position (SVN) HOB elevated   Post Treatment Assessment (SVN) breath sounds unchanged   Signs of Intolerance (SVN) none   Inhaler   $ Inhaler Charges MDI (Metered Dose Inahler) Treatment;Mouth rinsed post treatment   Daily Review of Necessity (Inhaler) completed   Respiratory Treatment Status (Inhaler) given;mouth rinsed post treatment   Treatment Route (Inhaler) mouthpiece   Patient Position (Inhaler) HOB elevated   Post Treatment Assessment (Inhaler) breath sounds unchanged   Signs of Intolerance (Inhaler) none   Preset CPAP/BiPAP Settings   Mode Of Delivery BiPAP S/T;Standby   $ CPAP/BiPAP Daily Charge BiPAP/CPAP Daily   Respiratory Evaluation   $ Care Plan Tech Time 15 min   Evaluation For   (care plan)

## 2020-05-13 LAB
ANION GAP SERPL CALC-SCNC: 8 MMOL/L (ref 8–16)
ANION GAP SERPL CALC-SCNC: 8 MMOL/L (ref 8–16)
BASOPHILS # BLD AUTO: 0 K/UL (ref 0–0.2)
BASOPHILS NFR BLD: 0 % (ref 0–1.9)
BNP SERPL-MCNC: 1204 PG/ML (ref 0–99)
BUN SERPL-MCNC: 48 MG/DL (ref 10–30)
BUN SERPL-MCNC: 48 MG/DL (ref 10–30)
CALCIUM SERPL-MCNC: 9.4 MG/DL (ref 8.7–10.5)
CALCIUM SERPL-MCNC: 9.4 MG/DL (ref 8.7–10.5)
CHLORIDE SERPL-SCNC: 108 MMOL/L (ref 95–110)
CHLORIDE SERPL-SCNC: 108 MMOL/L (ref 95–110)
CO2 SERPL-SCNC: 26 MMOL/L (ref 23–29)
CO2 SERPL-SCNC: 26 MMOL/L (ref 23–29)
CREAT SERPL-MCNC: 1.6 MG/DL (ref 0.5–1.4)
CREAT SERPL-MCNC: 1.6 MG/DL (ref 0.5–1.4)
DIFFERENTIAL METHOD: ABNORMAL
EOSINOPHIL # BLD AUTO: 0 K/UL (ref 0–0.5)
EOSINOPHIL NFR BLD: 0 % (ref 0–8)
ERYTHROCYTE [DISTWIDTH] IN BLOOD BY AUTOMATED COUNT: 14.7 % (ref 11.5–14.5)
ERYTHROCYTE [DISTWIDTH] IN BLOOD BY AUTOMATED COUNT: 14.7 % (ref 11.5–14.5)
EST. GFR  (AFRICAN AMERICAN): 31.3 ML/MIN/1.73 M^2
EST. GFR  (AFRICAN AMERICAN): 31.3 ML/MIN/1.73 M^2
EST. GFR  (NON AFRICAN AMERICAN): 27.2 ML/MIN/1.73 M^2
EST. GFR  (NON AFRICAN AMERICAN): 27.2 ML/MIN/1.73 M^2
GLUCOSE SERPL-MCNC: 155 MG/DL (ref 70–110)
GLUCOSE SERPL-MCNC: 155 MG/DL (ref 70–110)
HCT VFR BLD AUTO: 26.8 % (ref 37–48.5)
HCT VFR BLD AUTO: 26.8 % (ref 37–48.5)
HGB BLD-MCNC: 8.5 G/DL (ref 12–16)
HGB BLD-MCNC: 8.5 G/DL (ref 12–16)
IMM GRANULOCYTES # BLD AUTO: 0.05 K/UL (ref 0–0.04)
IMM GRANULOCYTES NFR BLD AUTO: 0.5 % (ref 0–0.5)
INR PPP: 7.7
INR PPP: 7.8
LYMPHOCYTES # BLD AUTO: 0.3 K/UL (ref 1–4.8)
LYMPHOCYTES NFR BLD: 3 % (ref 18–48)
MAGNESIUM SERPL-MCNC: 2.4 MG/DL (ref 1.6–2.6)
MCH RBC QN AUTO: 33.1 PG (ref 27–31)
MCH RBC QN AUTO: 33.1 PG (ref 27–31)
MCHC RBC AUTO-ENTMCNC: 31.7 G/DL (ref 32–36)
MCHC RBC AUTO-ENTMCNC: 31.7 G/DL (ref 32–36)
MCV RBC AUTO: 104 FL (ref 82–98)
MCV RBC AUTO: 104 FL (ref 82–98)
MONOCYTES # BLD AUTO: 0.1 K/UL (ref 0.3–1)
MONOCYTES NFR BLD: 1.2 % (ref 4–15)
NEUTROPHILS # BLD AUTO: 9 K/UL (ref 1.8–7.7)
NEUTROPHILS NFR BLD: 95.3 % (ref 38–73)
NRBC BLD-RTO: 0 /100 WBC
PHOSPHATE SERPL-MCNC: 3.6 MG/DL (ref 2.7–4.5)
PLATELET # BLD AUTO: 152 K/UL (ref 150–350)
PLATELET # BLD AUTO: 152 K/UL (ref 150–350)
PMV BLD AUTO: 10.6 FL (ref 9.2–12.9)
PMV BLD AUTO: 10.6 FL (ref 9.2–12.9)
POTASSIUM SERPL-SCNC: 4.3 MMOL/L (ref 3.5–5.1)
POTASSIUM SERPL-SCNC: 4.3 MMOL/L (ref 3.5–5.1)
PROTHROMBIN TIME: 62.7 SEC (ref 10.6–14.8)
PROTHROMBIN TIME: 63.4 SEC (ref 10.6–14.8)
RBC # BLD AUTO: 2.57 M/UL (ref 4–5.4)
RBC # BLD AUTO: 2.57 M/UL (ref 4–5.4)
SODIUM SERPL-SCNC: 142 MMOL/L (ref 136–145)
SODIUM SERPL-SCNC: 142 MMOL/L (ref 136–145)
WBC # BLD AUTO: 9.39 K/UL (ref 3.9–12.7)
WBC # BLD AUTO: 9.39 K/UL (ref 3.9–12.7)

## 2020-05-13 PROCEDURE — 63600175 PHARM REV CODE 636 W HCPCS: Performed by: INTERNAL MEDICINE

## 2020-05-13 PROCEDURE — 21400001 HC TELEMETRY ROOM

## 2020-05-13 PROCEDURE — 85610 PROTHROMBIN TIME: CPT

## 2020-05-13 PROCEDURE — 83735 ASSAY OF MAGNESIUM: CPT

## 2020-05-13 PROCEDURE — 94660 CPAP INITIATION&MGMT: CPT

## 2020-05-13 PROCEDURE — 99900035 HC TECH TIME PER 15 MIN (STAT)

## 2020-05-13 PROCEDURE — 84100 ASSAY OF PHOSPHORUS: CPT

## 2020-05-13 PROCEDURE — 27000221 HC OXYGEN, UP TO 24 HOURS

## 2020-05-13 PROCEDURE — 25000003 PHARM REV CODE 250: Performed by: INTERNAL MEDICINE

## 2020-05-13 PROCEDURE — 80048 BASIC METABOLIC PNL TOTAL CA: CPT

## 2020-05-13 PROCEDURE — 83880 ASSAY OF NATRIURETIC PEPTIDE: CPT

## 2020-05-13 PROCEDURE — 94761 N-INVAS EAR/PLS OXIMETRY MLT: CPT

## 2020-05-13 PROCEDURE — 94640 AIRWAY INHALATION TREATMENT: CPT

## 2020-05-13 PROCEDURE — 85025 COMPLETE CBC W/AUTO DIFF WBC: CPT

## 2020-05-13 PROCEDURE — 36415 COLL VENOUS BLD VENIPUNCTURE: CPT

## 2020-05-13 RX ORDER — PHYTONADIONE 10 MG/ML
2 INJECTION, EMULSION INTRAMUSCULAR; INTRAVENOUS; SUBCUTANEOUS ONCE
Status: COMPLETED | OUTPATIENT
Start: 2020-05-13 | End: 2020-05-13

## 2020-05-13 RX ADMIN — METHYLPREDNISOLONE SODIUM SUCCINATE 40 MG: 40 INJECTION, POWDER, FOR SOLUTION INTRAMUSCULAR; INTRAVENOUS at 12:05

## 2020-05-13 RX ADMIN — SIMVASTATIN 20 MG: 20 TABLET, FILM COATED ORAL at 09:05

## 2020-05-13 RX ADMIN — METHYLPREDNISOLONE SODIUM SUCCINATE 40 MG: 40 INJECTION, POWDER, FOR SOLUTION INTRAMUSCULAR; INTRAVENOUS at 05:05

## 2020-05-13 RX ADMIN — AMLODIPINE BESYLATE 5 MG: 5 TABLET ORAL at 08:05

## 2020-05-13 RX ADMIN — PHYTONADIONE 2 MG: 10 INJECTION, EMULSION INTRAMUSCULAR; INTRAVENOUS; SUBCUTANEOUS at 10:05

## 2020-05-13 RX ADMIN — CHLORHEXIDINE GLUCONATE 15 ML: 1.2 RINSE ORAL at 08:05

## 2020-05-13 RX ADMIN — FUROSEMIDE 40 MG: 10 INJECTION, SOLUTION INTRAMUSCULAR; INTRAVENOUS at 08:05

## 2020-05-13 RX ADMIN — FLUTICASONE FUROATE AND VILANTEROL TRIFENATATE 1 PUFF: 100; 25 POWDER RESPIRATORY (INHALATION) at 08:05

## 2020-05-13 RX ADMIN — METOPROLOL SUCCINATE 25 MG: 25 TABLET, FILM COATED, EXTENDED RELEASE ORAL at 08:05

## 2020-05-13 RX ADMIN — ALLOPURINOL 100 MG: 100 TABLET ORAL at 08:05

## 2020-05-13 RX ADMIN — MUPIROCIN: 20 OINTMENT TOPICAL at 09:05

## 2020-05-13 RX ADMIN — LEVOTHYROXINE SODIUM 88 MCG: 88 TABLET ORAL at 05:05

## 2020-05-13 RX ADMIN — CEFTRIAXONE 1 G: 1 INJECTION, SOLUTION INTRAVENOUS at 05:05

## 2020-05-13 RX ADMIN — SPIRONOLACTONE 25 MG: 25 TABLET ORAL at 09:05

## 2020-05-13 RX ADMIN — MELATONIN 6 MG: at 09:05

## 2020-05-13 NOTE — PROGRESS NOTES
[facility]  Cardiology  Progress Note    Patient Name: Jillian Borrego  MRN: [id number]  Admission Date: 5/11/2020  Hospital Length of Stay: 2 days  Code Status: DNR   Attending Physician: Brenton Dumont MD   Primary Care Physician: Primary Doctor No  Expected Discharge Date:   Principal Problem:Acute hypoxemic respiratory failure    Subjective:     Hospital Course:   No notes on file    Interval History:  During the night she had some shortness of breath.  She was cold.  She could not tolerate the breathing treatment.  She is feeling better today.    Review of Systems   Constitution: Negative. Negative for fever and weight gain.   Cardiovascular: Negative.  Negative for chest pain and leg swelling.   Respiratory: Positive for cough and shortness of breath.    Musculoskeletal: Negative.  Negative for back pain and muscle cramps.   Gastrointestinal: Negative.  Negative for abdominal pain and constipation.   Neurological: Positive for weakness. Negative for dizziness.   Psychiatric/Behavioral: Negative.  Negative for altered mental status and hallucinations.     Objective:     Vital Signs (Most Recent):  Temp: 97.7 °F (36.5 °C) (05/13/20 0710)  Pulse: 78 (05/13/20 0816)  Resp: 18 (05/13/20 0816)  BP: (!) 115/57 (05/13/20 0710)  SpO2: (!) 94 % (05/13/20 0816) Vital Signs (24h Range):  Temp:  [97.6 °F (36.4 °C)-98.3 °F (36.8 °C)] 97.7 °F (36.5 °C)  Pulse:  [63-78] 78  Resp:  [16-44] 18  SpO2:  [91 %-99 %] 94 %  BP: (115-166)/(57-97) 115/57     Weight: 64.5 kg (142 lb 3.2 oz)  Body mass index is 24.41 kg/m².     SpO2: (!) 94 %  O2 Device (Oxygen Therapy): nasal cannula    No intake or output data in the 24 hours ending 05/13/20 0934    Lines/Drains/Airways     Drain            Female External Urinary Catheter 05/11/20 1800 1 day          Peripheral Intravenous Line                 Peripheral IV - Single Lumen 05/11/20 1745 20 G Left Antecubital 1 day                Physical Exam   Constitutional: She is  oriented to person, place, and time. She appears well-developed and well-nourished.   HENT:   Head: Normocephalic and atraumatic.   Eyes: Conjunctivae and EOM are normal.   Cardiovascular: Normal rate and normal heart sounds. An irregular rhythm present.   Pulmonary/Chest: Effort normal. She has rales.   Abdominal: Soft. Bowel sounds are normal.   Musculoskeletal: She exhibits no edema.   Neurological: She is alert and oriented to person, place, and time.   Skin: Skin is warm and dry.   Multiple areas of ecchymosis   Psychiatric: She has a normal mood and affect. Her behavior is normal. Judgment and thought content normal.   Nursing note and vitals reviewed.      Significant Labs:   BMP:   Recent Labs   Lab 05/11/20  1220 05/12/20 0347 05/13/20  0357   * 173* 155*  155*    141 142  142   K 4.4 4.8 4.3  4.3    107 108  108   CO2 26 24 26  26   BUN 37* 37* 48*  48*   CREATININE 1.5* 1.4 1.6*  1.6*   CALCIUM 9.5 9.5 9.4  9.4   MG 2.3 2.3 2.4   , CMP   Recent Labs   Lab 05/11/20  1220 05/12/20  0347 05/13/20  0357    141 142  142   K 4.4 4.8 4.3  4.3    107 108  108   CO2 26 24 26  26   * 173* 155*  155*   BUN 37* 37* 48*  48*   CREATININE 1.5* 1.4 1.6*  1.6*   CALCIUM 9.5 9.5 9.4  9.4   PROT 6.8  --   --    ALBUMIN 3.7  --   --    BILITOT 1.2*  --   --    ALKPHOS 65  --   --    AST 25  --   --    ALT 16  --   --    ANIONGAP 7* 10 8  8   ESTGFRAFRICA 33.9* 36.8* 31.3*  31.3*   EGFRNONAA 29.4* 32.0* 27.2*  27.2*   , CBC   Recent Labs   Lab 05/11/20  1220  05/12/20  0009 05/12/20 0347 05/13/20  0736   WBC 9.06  --   --  7.37 9.39  9.39   HGB 8.8*   < > 9.1* 8.9* 8.5*  8.5*   HCT 28.4*   < > 28.9* 28.5* 26.8*  26.8*   PLT 81*  --   --  100* 152  152    < > = values in this interval not displayed.   , INR   Recent Labs   Lab 05/11/20  1801 05/13/20  0357 05/13/20  0736   INR 5.0* 7.8* 7.7*    and Troponin   Recent Labs   Lab 05/11/20  1220 05/11/20  1801  05/12/20  0347   TROPONINI 0.361* 1.130* 1.201*     BNP  Recent Labs   Lab 05/13/20  0357   BNP 1,204*     Significant Imaging: X-Ray: CXR: X-Ray Chest 1 View (CXR): No results found for this visit on 05/11/20.    Assessment and Plan:         Thrombocytopenia  It has improved.    CKD (chronic kidney disease)  Stable    Hypertension  Control on current medical therapy    Permanent atrial fibrillation  With rate control.    Supratherapeutic INR  Markedly increase INR.  Will give her subcutaneous vitamin K.    Symptomatic anemia  It is probably a contributing factor to her heart failure.  Consider blood transfusions    Acute on chronic heart failure  Anemia is probably contributing factor to her heart failure.  Follow her hemoglobin hematocrit closely.  She might require blood transfusion to help her with her heart failure.        VTE Risk Mitigation (From admission, onward)         Ordered     IP VTE HIGH RISK PATIENT  Once      05/11/20 1503     Place sequential compression device  Until discontinued      05/11/20 1503                Sohan Rebolledo MD  Cardiology  [facility]

## 2020-05-13 NOTE — SUBJECTIVE & OBJECTIVE
Interval History:  During the night she had some shortness of breath.  She was cold.  She could not tolerate the breathing treatment.  She is feeling better today.    Review of Systems   Constitution: Negative. Negative for fever and weight gain.   Cardiovascular: Negative.  Negative for chest pain and leg swelling.   Respiratory: Positive for cough and shortness of breath.    Musculoskeletal: Negative.  Negative for back pain and muscle cramps.   Gastrointestinal: Negative.  Negative for abdominal pain and constipation.   Neurological: Positive for weakness. Negative for dizziness.   Psychiatric/Behavioral: Negative.  Negative for altered mental status and hallucinations.     Objective:     Vital Signs (Most Recent):  Temp: 97.7 °F (36.5 °C) (05/13/20 0710)  Pulse: 78 (05/13/20 0816)  Resp: 18 (05/13/20 0816)  BP: (!) 115/57 (05/13/20 0710)  SpO2: (!) 94 % (05/13/20 0816) Vital Signs (24h Range):  Temp:  [97.6 °F (36.4 °C)-98.3 °F (36.8 °C)] 97.7 °F (36.5 °C)  Pulse:  [63-78] 78  Resp:  [16-44] 18  SpO2:  [91 %-99 %] 94 %  BP: (115-166)/(57-97) 115/57     Weight: 64.5 kg (142 lb 3.2 oz)  Body mass index is 24.41 kg/m².     SpO2: (!) 94 %  O2 Device (Oxygen Therapy): nasal cannula    No intake or output data in the 24 hours ending 05/13/20 0934    Lines/Drains/Airways     Drain            Female External Urinary Catheter 05/11/20 1800 1 day          Peripheral Intravenous Line                 Peripheral IV - Single Lumen 05/11/20 1745 20 G Left Antecubital 1 day                Physical Exam   Constitutional: She is oriented to person, place, and time. She appears well-developed and well-nourished.   HENT:   Head: Normocephalic and atraumatic.   Eyes: Conjunctivae and EOM are normal.   Cardiovascular: Normal rate and normal heart sounds. An irregular rhythm present.   Pulmonary/Chest: Effort normal. She has rales.   Abdominal: Soft. Bowel sounds are normal.   Musculoskeletal: She exhibits no edema.   Neurological:  She is alert and oriented to person, place, and time.   Skin: Skin is warm and dry.   Multiple areas of ecchymosis   Psychiatric: She has a normal mood and affect. Her behavior is normal. Judgment and thought content normal.   Nursing note and vitals reviewed.      Significant Labs:   BMP:   Recent Labs   Lab 05/11/20 1220 05/12/20 0347 05/13/20 0357   * 173* 155*  155*    141 142  142   K 4.4 4.8 4.3  4.3    107 108  108   CO2 26 24 26  26   BUN 37* 37* 48*  48*   CREATININE 1.5* 1.4 1.6*  1.6*   CALCIUM 9.5 9.5 9.4  9.4   MG 2.3 2.3 2.4   , CMP   Recent Labs   Lab 05/11/20 1220 05/12/20 0347 05/13/20 0357    141 142  142   K 4.4 4.8 4.3  4.3    107 108  108   CO2 26 24 26  26   * 173* 155*  155*   BUN 37* 37* 48*  48*   CREATININE 1.5* 1.4 1.6*  1.6*   CALCIUM 9.5 9.5 9.4  9.4   PROT 6.8  --   --    ALBUMIN 3.7  --   --    BILITOT 1.2*  --   --    ALKPHOS 65  --   --    AST 25  --   --    ALT 16  --   --    ANIONGAP 7* 10 8  8   ESTGFRAFRICA 33.9* 36.8* 31.3*  31.3*   EGFRNONAA 29.4* 32.0* 27.2*  27.2*   , CBC   Recent Labs   Lab 05/11/20 1220 05/12/20  0009 05/12/20 0347 05/13/20  0736   WBC 9.06  --   --  7.37 9.39  9.39   HGB 8.8*   < > 9.1* 8.9* 8.5*  8.5*   HCT 28.4*   < > 28.9* 28.5* 26.8*  26.8*   PLT 81*  --   --  100* 152  152    < > = values in this interval not displayed.   , INR   Recent Labs   Lab 05/11/20  1801 05/13/20  0357 05/13/20  0736   INR 5.0* 7.8* 7.7*    and Troponin   Recent Labs   Lab 05/11/20  1220 05/11/20  1801 05/12/20  0347   TROPONINI 0.361* 1.130* 1.201*     BNP  Recent Labs   Lab 05/13/20  0357   BNP 1,204*     Significant Imaging: X-Ray: CXR: X-Ray Chest 1 View (CXR): No results found for this visit on 05/11/20.

## 2020-05-13 NOTE — PLAN OF CARE
05/12/20 1930   Patient Assessment/Suction   Level of Consciousness (AVPU) alert   Respiratory Effort Normal;Unlabored   Expansion/Accessory Muscles/Retractions no use of accessory muscles   All Lung Fields Breath Sounds clear   Rhythm/Pattern, Respiratory unlabored   Cough Frequency no cough   PRE-TX-O2   O2 Device (Oxygen Therapy) High Flow nasal Cannula   Flow (L/min) 3   SpO2 (!) 94 %   Pulse 68   Resp 18   Aerosol Therapy   $ Aerosol Therapy Charges PRN treatment not required   Respiratory Treatment Status (SVN) PRN treatment not required   Respiratory Evaluation   $ Care Plan Tech Time 15 min   Evaluation For   (care plan)

## 2020-05-13 NOTE — PLAN OF CARE
05/13/20 0816   Patient Assessment/Suction   Level of Consciousness (AVPU) alert   Respiratory Effort Unlabored   Expansion/Accessory Muscles/Retractions no use of accessory muscles   All Lung Fields Breath Sounds diminished   Rhythm/Pattern, Respiratory no shortness of breath reported   Cough Frequency infrequent   Cough Type dry   PRE-TX-O2   O2 Device (Oxygen Therapy) nasal cannula   $ Is the patient on Low Flow Oxygen? Yes   Flow (L/min) 3   SpO2 (!) 94 %   Pulse Oximetry Type Intermittent   $ Pulse Oximetry - Multiple Charge Pulse Oximetry - Multiple   Pulse 78   Resp 18   Inhaler   $ Inhaler Charges MDI (Metered Dose Inahler) Treatment;Independent   Daily Review of Necessity (Inhaler) completed   Respiratory Treatment Status (Inhaler) given   Treatment Route (Inhaler) mouthpiece   Patient Position (Inhaler) HOB elevated   Post Treatment Assessment (Inhaler) breath sounds unchanged   Signs of Intolerance (Inhaler) none   Preset CPAP/BiPAP Settings   Mode Of Delivery BiPAP;Standby   $ CPAP/BiPAP Daily Charge BiPAP/CPAP Daily   Respiratory Evaluation   $ Care Plan Tech Time 15 min

## 2020-05-13 NOTE — SUBJECTIVE & OBJECTIVE
Interval History: slowly improving    Review of Systems   Constitutional: Positive for fatigue.   HENT: Negative.    Eyes: Negative.    Respiratory: Negative.    Cardiovascular: Negative.    Gastrointestinal: Negative.    Endocrine: Negative.    Genitourinary: Negative.    Musculoskeletal: Negative.    Skin: Negative.    Allergic/Immunologic: Negative.    Neurological: Negative.    Hematological: Negative.    All other systems reviewed and are negative.    Objective:     Vital Signs (Most Recent):  Temp: 97.7 °F (36.5 °C) (05/13/20 0710)  Pulse: 78 (05/13/20 0816)  Resp: 18 (05/13/20 0816)  BP: (!) 115/57 (05/13/20 0710)  SpO2: (!) 94 % (05/13/20 0816) Vital Signs (24h Range):  Temp:  [97.6 °F (36.4 °C)-98.3 °F (36.8 °C)] 97.7 °F (36.5 °C)  Pulse:  [63-78] 78  Resp:  [16-35] 18  SpO2:  [94 %-99 %] 94 %  BP: (115-166)/(57-70) 115/57     Weight: 64.5 kg (142 lb 3.2 oz)  Body mass index is 24.41 kg/m².  No intake or output data in the 24 hours ending 05/13/20 1239   Physical Exam   Constitutional: She is oriented to person, place, and time. She appears well-developed and well-nourished.   HENT:   Head: Normocephalic and atraumatic.   Eyes: Pupils are equal, round, and reactive to light. Conjunctivae and EOM are normal.   Neck: Normal range of motion. Neck supple.   Cardiovascular: Normal rate, regular rhythm, normal heart sounds and intact distal pulses.   Pulmonary/Chest:   Decreased entry without adventitious sounds   Abdominal: Soft. Bowel sounds are normal.   Musculoskeletal: Normal range of motion.   Neurological: She is alert and oriented to person, place, and time.   Skin: Skin is warm and dry. Capillary refill takes less than 2 seconds.   Psychiatric: She has a normal mood and affect. Her behavior is normal. Judgment and thought content normal.   Nursing note and vitals reviewed.      Significant Labs:   BMP:   Recent Labs   Lab 05/13/20  0357   *  155*     142   K 4.3  4.3     108   CO2  26  26   BUN 48*  48*   CREATININE 1.6*  1.6*   CALCIUM 9.4  9.4   MG 2.4     CBC:   Recent Labs   Lab 05/12/20  0009 05/12/20  0347 05/13/20  0736   WBC  --  7.37 9.39  9.39   HGB 9.1* 8.9* 8.5*  8.5*   HCT 28.9* 28.5* 26.8*  26.8*   PLT  --  100* 152  152     Coagulation:   Recent Labs   Lab 05/13/20  0736   INR 7.7*       Significant Imaging: I have reviewed and interpreted all pertinent imaging results/findings within the past 24 hours.

## 2020-05-13 NOTE — PROGRESS NOTES
"[facility]  Adult Nutrition   Progress Note (Follow-Up)    SUMMARY     Recommendations/Interventions:    Recommendation/Intervention: 1. Encouraged PO intake of meals. 2.  to assist with menu  Goals: 1. Pt to meet at least 75% of nutritional needs via po intake.  Now on cardiac diet intake may improve. Pt was not satisfied with clear liquid diet and desired diet advancement    Dietitian Rounds Brief:    Spoke with RN about pt's diet advancing- was on clear liquid         Reason for Assessment  Reason For Assessment: RD follow-up  Relevant Medical History: COPD, A-fib, HTN, advanced agem CHF, sympotamtic anemia, Hx stroke w/ot residual deficits  Interdisciplinary Rounds: attended  Nutrition Discharge Planning: least restricitve diet as tolerated     Nutrition Risk Screen  Nutrition Risk Screen: no indicators present             Nutrition/Diet History  Spiritual, Cultural Beliefs, Jew Practices, Values that Affect Care: no  Food Allergies: NKFA  Factors Affecting Nutritional Intake: none     Anthropometrics  Temp: 97.7 °F (36.5 °C)  Height Method: Stated  Height: 5' 4" (162.6 cm)  Height (inches): 64 in  Weight Method: Bed Scale  Weight: 64.5 kg (142 lb 3.2 oz)  Weight (lb): 142.2 lb  Ideal Body Weight (IBW), Female: 120 lb  % Ideal Body Weight, Female (lb): 118.5 %  BMI (Calculated): 24.4  BMI Grade: 18.5-24.9 - normal       Weight History:  Wt Readings from Last 10 Encounters:   05/13/20 64.5 kg (142 lb 3.2 oz)   05/12/20 64.5 kg (142 lb 3.2 oz)   10/16/17 62.1 kg (137 lb)   01/07/11 71.4 kg (157 lb 7.2 oz)   }  Lab/Procedures/Meds: Pertinent Labs Reviewed  Clinical Chemistry:  Recent Labs   Lab 05/11/20  1220 05/12/20  0347 05/13/20  0357    141 142  142   K 4.4 4.8 4.3  4.3    107 108  108   CO2 26 24 26  26   * 173* 155*  155*   BUN 37* 37* 48*  48*   CREATININE 1.5* 1.4 1.6*  1.6*   CALCIUM 9.5 9.5 9.4  9.4   PROT 6.8  --   --    ALBUMIN 3.7  --   --  "   BILITOT 1.2*  --   --    ALKPHOS 65  --   --    AST 25  --   --    ALT 16  --   --    ANIONGAP 7* 10 8  8   ESTGFRAFRICA 33.9* 36.8* 31.3*  31.3*   EGFRNONAA 29.4* 32.0* 27.2*  27.2*   MG 2.3 2.3 2.4   PHOS 4.0 4.1 3.6     CBC:   Recent Labs   Lab 05/12/20  0347   WBC 7.37   RBC 2.70*   HGB 8.9*   HCT 28.5*   *   *   MCH 33.0*   MCHC 31.2*       Cardiac Profile:  Recent Labs   Lab 05/11/20  1220 05/11/20  1801 05/12/20  0347 05/13/20  0357   *  892* 1,222*  --  1,204*   TROPONINI 0.361* 1.130* 1.201*  --      Medications: Pertinent Medications reviewed  Scheduled Meds:   allopurinoL  100 mg Oral Daily    amLODIPine  5 mg Oral Daily    aspirin  325 mg Oral Once    cefTRIAXone (ROCEPHIN) IVPB  1 g Intravenous Q24H    chlorhexidine  15 mL Mouth/Throat BID    fluticasone furoate-vilanteroL  1 puff Inhalation Daily    furosemide (LASIX) IV  40 mg Intravenous Daily    levothyroxine  88 mcg Oral Daily    methylPREDNISolone sodium succinate  40 mg Intravenous Q6H    metoprolol succinate  25 mg Oral Daily    mupirocin   Nasal BID    simvastatin  20 mg Oral QHS    spironolactone  25 mg Oral Daily     Continuous Infusions:  PRN Meds:.acetaminophen, acetaminophen, albuterol-ipratropium, calcium chloride IVPB, calcium chloride IVPB, calcium chloride IVPB, magnesium oxide, magnesium sulfate IVPB, magnesium sulfate IVPB, magnesium sulfate IVPB, magnesium sulfate IVPB, melatonin, ondansetron, polyethylene glycol, potassium chloride in water, potassium chloride in water, potassium chloride in water, potassium chloride in water, potassium chloride, potassium chloride, potassium chloride, potassium chloride, sodium chloride 0.9%, sodium phosphate IVPB, sodium phosphate IVPB, sodium phosphate IVPB, sodium phosphate IVPB, sodium phosphate IVPB    Estimated/Assessed Needs    Weight Used For Calorie Calculations: 64.5 kg (142 lb 3.2 oz)  Energy Calorie Requirements (kcal): 5130-0159 calorie (25-30)    Energy Need Method: Kcal/kg  Protein Requirements: 65 g (1 g/kg BW)   Weight Used For Protein Calculations: 64.5 kg (142 lb 3.2 oz)  Fluid Requirements (mL): 1613 ml or pr MD   Estimated Fluid Requirement Method: RDA Method    Nutrition Prescription Ordered    Current Diet Order: Cardiac- changed 5/13/20 after breakfast    Evaluation of Received Nutrient/Fluid Intake    Energy Calories Required: not meeting needs  Protein Required: not meeting needs  Fluid Required: meeting needs  Tolerance: tolerating  % Intake of Estimated Energy Needs: 0 - 25 %  % Meal Intake: 0 - 25 %    Intake/Output Summary (Last 24 hours) at 5/13/2020 0809  Last data filed at 5/12/2020 0825  Gross per 24 hour   Intake --   Output 200 ml   Net -200 ml      Nutrition Risk    Level of Risk/Frequency of Follow-up: high   Monitor and Evaluation    Food and Nutrient Intake: food and beverage intake  Food and Nutrient Adminstration: diet order  Knowledge/Beliefs/Attitudes: food and nutrition knowledge/skill  Physical Activity and Function: nutrition-related ADLs and IADLs, factors affecting access to physical activity  Anthropometric Measurements: weight, weight change  Biochemical Data, Medical Tests and Procedures: lipid profile, electrolyte and renal panel, gastrointestinal profile, glucose/endocrine profile, inflammatory profile  Nutrition-Focused Physical Findings: overall appearance     Nutrition Follow-Up    RD Follow-up?: Yes   Carleen Mora RD 5/13/20

## 2020-05-13 NOTE — PLAN OF CARE
Plan of care, medications and safety reviewed with patient.      Problem: Fall Injury Risk  Goal: Absence of Fall and Fall-Related Injury  Outcome: Ongoing, Progressing     Problem: Infection  Goal: Infection Symptom Resolution  Outcome: Ongoing, Progressing     Problem: Adult Inpatient Plan of Care  Goal: Plan of Care Review  Outcome: Ongoing, Progressing  Goal: Patient-Specific Goal (Individualization)  Outcome: Ongoing, Progressing  Goal: Absence of Hospital-Acquired Illness or Injury  Outcome: Ongoing, Progressing  Goal: Optimal Comfort and Wellbeing  Outcome: Ongoing, Progressing  Goal: Readiness for Transition of Care  Outcome: Ongoing, Progressing  Goal: Rounds/Family Conference  Outcome: Ongoing, Progressing     Problem: Skin Injury Risk Increased  Goal: Skin Health and Integrity  Outcome: Ongoing, Progressing

## 2020-05-13 NOTE — PROGRESS NOTES
"[facility]  Progress Note    Patient Name: Jillian Borrego  MRN: [id number]  Patient Class: IP- Inpatient   Admission Date: 5/11/2020  Length of Stay: 2 days  Attending Physician: Brenton Dumont MD  Primary Care Provider: Primary Doctor No        Subjective:     Principal Problem:Acute hypoxemic respiratory failure        HPI:  HPI per patient and per ER records  89-year-old female with COPD, chronic heart failure, hyperlipidemia, hypothyroidism, chronic atrial fibrillation on warfarin, CKD, advanced age, DNR comes in for shortness of breath and fall.  Per ER records, patient had at accidental trip and fell and struck her head.  EMS was called and patient appeared short of breath however patient refused to come to the ED.  Patient continued to have shortness of breath during the day and EMS was subsequently called again and brought patient to the ED for further evaluation.  During my interview, patient is short of breath and tachypneic saturating 84% on 5 L nasal cannula.  Able to speak and wanted to worsen sepsis.  Reports living home alone.  Her son and daughter come see her regularly.  Patient was then placed on BiPAP.  Denies fever, chills, chest pain.  Denies hemoptysis, hematuria, melena, hematochezia, gross bleeding.    In the ED, RR 30s, 84% on 5 L, H&H 8.8/28.4 (Baseline 12-13 in 2019), INR 5.3, Cr 1.5 (BL 1.3), , troponin 0.361,  UA with leukocytes and many bacteria, head CT unremarkable, chest x-ray concern for acute heart failure.    Overview/Hospital Course:  05/12 Discussed patient with Intensivist: stable for transfer to Cardiology unit. Patient feels "Better". Is able to speak in short sentences. Labs reviewed INR = 5.0; Stage 3 CKI    05/13 Labs reviewed INR = 7.7. Received vitamin K. Feels "Good". More alert today. Breathing more easily    Interval History: slowly improving    Review of Systems   Constitutional: Positive for fatigue.   HENT: Negative.    Eyes: " Negative.    Respiratory: Negative.    Cardiovascular: Negative.    Gastrointestinal: Negative.    Endocrine: Negative.    Genitourinary: Negative.    Musculoskeletal: Negative.    Skin: Negative.    Allergic/Immunologic: Negative.    Neurological: Negative.    Hematological: Negative.    All other systems reviewed and are negative.    Objective:     Vital Signs (Most Recent):  Temp: 97.7 °F (36.5 °C) (05/13/20 0710)  Pulse: 78 (05/13/20 0816)  Resp: 18 (05/13/20 0816)  BP: (!) 115/57 (05/13/20 0710)  SpO2: (!) 94 % (05/13/20 0816) Vital Signs (24h Range):  Temp:  [97.6 °F (36.4 °C)-98.3 °F (36.8 °C)] 97.7 °F (36.5 °C)  Pulse:  [63-78] 78  Resp:  [16-35] 18  SpO2:  [94 %-99 %] 94 %  BP: (115-166)/(57-70) 115/57     Weight: 64.5 kg (142 lb 3.2 oz)  Body mass index is 24.41 kg/m².  No intake or output data in the 24 hours ending 05/13/20 1239   Physical Exam   Constitutional: She is oriented to person, place, and time. She appears well-developed and well-nourished.   HENT:   Head: Normocephalic and atraumatic.   Eyes: Pupils are equal, round, and reactive to light. Conjunctivae and EOM are normal.   Neck: Normal range of motion. Neck supple.   Cardiovascular: Normal rate, regular rhythm, normal heart sounds and intact distal pulses.   Pulmonary/Chest:   Decreased entry without adventitious sounds   Abdominal: Soft. Bowel sounds are normal.   Musculoskeletal: Normal range of motion.   Neurological: She is alert and oriented to person, place, and time.   Skin: Skin is warm and dry. Capillary refill takes less than 2 seconds.   Psychiatric: She has a normal mood and affect. Her behavior is normal. Judgment and thought content normal.   Nursing note and vitals reviewed.      Significant Labs:   BMP:   Recent Labs   Lab 05/13/20  0357   *  155*     142   K 4.3  4.3     108   CO2 26  26   BUN 48*  48*   CREATININE 1.6*  1.6*   CALCIUM 9.4  9.4   MG 2.4     CBC:   Recent Labs   Lab 05/12/20  0003  05/12/20  0347 05/13/20  0736   WBC  --  7.37 9.39  9.39   HGB 9.1* 8.9* 8.5*  8.5*   HCT 28.9* 28.5* 26.8*  26.8*   PLT  --  100* 152  152     Coagulation:   Recent Labs   Lab 05/13/20  0736   INR 7.7*       Significant Imaging: I have reviewed and interpreted all pertinent imaging results/findings within the past 24 hours.      Assessment/Plan:      Congestive heart failure  Continue current regimen    DNR (do not resuscitate)        COPD exacerbation  Continue current regimen      Thrombocytopenia  Continue current regimen      CKD (chronic kidney disease)  Continue current regimen      Advanced age        Hypothyroidism  Continue current regimen      Hyperlipidemia    Continue current regimen    Hypertension  Continue current regimen      Permanent atrial fibrillation  Continue current regimen, except hold warfrin      Supratherapeutic INR  Continue hold warfarin      Symptomatic anemia  Stable       Acute on chronic heart failure  Continue current regimen; check ECHO      COPD (chronic obstructive pulmonary disease)  Continue supplemental oxygen        VTE Risk Mitigation (From admission, onward)         Ordered     IP VTE HIGH RISK PATIENT  Once      05/11/20 1503     Place sequential compression device  Until discontinued      05/11/20 1503                      Brenton Dumont MD  Department of Hospital Medicine   [facility]

## 2020-05-14 LAB
ANION GAP SERPL CALC-SCNC: 7 MMOL/L (ref 8–16)
BLD PROD TYP BPU: NORMAL
BLOOD UNIT EXPIRATION DATE: NORMAL
BLOOD UNIT TYPE CODE: 9500
BLOOD UNIT TYPE: NORMAL
BUN SERPL-MCNC: 69 MG/DL (ref 10–30)
CALCIUM SERPL-MCNC: 9.5 MG/DL (ref 8.7–10.5)
CHLORIDE SERPL-SCNC: 106 MMOL/L (ref 95–110)
CO2 SERPL-SCNC: 27 MMOL/L (ref 23–29)
CODING SYSTEM: NORMAL
CREAT SERPL-MCNC: 1.6 MG/DL (ref 0.5–1.4)
DISPENSE STATUS: NORMAL
ERYTHROCYTE [DISTWIDTH] IN BLOOD BY AUTOMATED COUNT: 14.6 % (ref 11.5–14.5)
EST. GFR  (AFRICAN AMERICAN): 31.3 ML/MIN/1.73 M^2
EST. GFR  (NON AFRICAN AMERICAN): 27.2 ML/MIN/1.73 M^2
GLUCOSE SERPL-MCNC: 143 MG/DL (ref 70–110)
HCT VFR BLD AUTO: 24.8 % (ref 37–48.5)
HGB BLD-MCNC: 7.9 G/DL (ref 12–16)
INR PPP: 3.1
MAGNESIUM SERPL-MCNC: 2.4 MG/DL (ref 1.6–2.6)
MCH RBC QN AUTO: 33.1 PG (ref 27–31)
MCHC RBC AUTO-ENTMCNC: 31.9 G/DL (ref 32–36)
MCV RBC AUTO: 104 FL (ref 82–98)
NUM UNITS TRANS PACKED RBC: NORMAL
PHOSPHATE SERPL-MCNC: 3.6 MG/DL (ref 2.7–4.5)
PLATELET # BLD AUTO: 126 K/UL (ref 150–350)
PMV BLD AUTO: 11.5 FL (ref 9.2–12.9)
POTASSIUM SERPL-SCNC: 4.6 MMOL/L (ref 3.5–5.1)
PROTHROMBIN TIME: 30.6 SEC (ref 10.6–14.8)
RBC # BLD AUTO: 2.39 M/UL (ref 4–5.4)
SODIUM SERPL-SCNC: 140 MMOL/L (ref 136–145)
WBC # BLD AUTO: 9.89 K/UL (ref 3.9–12.7)

## 2020-05-14 PROCEDURE — 85610 PROTHROMBIN TIME: CPT

## 2020-05-14 PROCEDURE — 63600175 PHARM REV CODE 636 W HCPCS: Performed by: INTERNAL MEDICINE

## 2020-05-14 PROCEDURE — 94640 AIRWAY INHALATION TREATMENT: CPT

## 2020-05-14 PROCEDURE — 92526 ORAL FUNCTION THERAPY: CPT

## 2020-05-14 PROCEDURE — 27000221 HC OXYGEN, UP TO 24 HOURS

## 2020-05-14 PROCEDURE — 21400001 HC TELEMETRY ROOM

## 2020-05-14 PROCEDURE — 25000242 PHARM REV CODE 250 ALT 637 W/ HCPCS: Performed by: INTERNAL MEDICINE

## 2020-05-14 PROCEDURE — 84100 ASSAY OF PHOSPHORUS: CPT

## 2020-05-14 PROCEDURE — 36415 COLL VENOUS BLD VENIPUNCTURE: CPT

## 2020-05-14 PROCEDURE — 94660 CPAP INITIATION&MGMT: CPT

## 2020-05-14 PROCEDURE — P9016 RBC LEUKOCYTES REDUCED: HCPCS

## 2020-05-14 PROCEDURE — 83735 ASSAY OF MAGNESIUM: CPT

## 2020-05-14 PROCEDURE — 36430 TRANSFUSION BLD/BLD COMPNT: CPT

## 2020-05-14 PROCEDURE — 25000003 PHARM REV CODE 250: Performed by: INTERNAL MEDICINE

## 2020-05-14 PROCEDURE — 85027 COMPLETE CBC AUTOMATED: CPT

## 2020-05-14 PROCEDURE — 99900035 HC TECH TIME PER 15 MIN (STAT)

## 2020-05-14 PROCEDURE — 94761 N-INVAS EAR/PLS OXIMETRY MLT: CPT

## 2020-05-14 PROCEDURE — 80048 BASIC METABOLIC PNL TOTAL CA: CPT

## 2020-05-14 RX ORDER — FUROSEMIDE 10 MG/ML
20 INJECTION INTRAMUSCULAR; INTRAVENOUS ONCE
Status: COMPLETED | OUTPATIENT
Start: 2020-05-14 | End: 2020-05-14

## 2020-05-14 RX ORDER — HYDROCODONE BITARTRATE AND ACETAMINOPHEN 500; 5 MG/1; MG/1
TABLET ORAL
Status: DISCONTINUED | OUTPATIENT
Start: 2020-05-14 | End: 2020-05-16 | Stop reason: HOSPADM

## 2020-05-14 RX ADMIN — CHLORHEXIDINE GLUCONATE 15 ML: 1.2 RINSE ORAL at 07:05

## 2020-05-14 RX ADMIN — METHYLPREDNISOLONE SODIUM SUCCINATE 40 MG: 40 INJECTION, POWDER, FOR SOLUTION INTRAMUSCULAR; INTRAVENOUS at 05:05

## 2020-05-14 RX ADMIN — METOPROLOL SUCCINATE 25 MG: 25 TABLET, FILM COATED, EXTENDED RELEASE ORAL at 10:05

## 2020-05-14 RX ADMIN — AMLODIPINE BESYLATE 5 MG: 5 TABLET ORAL at 10:05

## 2020-05-14 RX ADMIN — FUROSEMIDE 20 MG: 10 INJECTION, SOLUTION INTRAMUSCULAR; INTRAVENOUS at 04:05

## 2020-05-14 RX ADMIN — IPRATROPIUM BROMIDE AND ALBUTEROL SULFATE 3 ML: .5; 3 SOLUTION RESPIRATORY (INHALATION) at 09:05

## 2020-05-14 RX ADMIN — FLUTICASONE FUROATE AND VILANTEROL TRIFENATATE 1 PUFF: 100; 25 POWDER RESPIRATORY (INHALATION) at 08:05

## 2020-05-14 RX ADMIN — LEVOTHYROXINE SODIUM 88 MCG: 88 TABLET ORAL at 05:05

## 2020-05-14 RX ADMIN — SIMVASTATIN 20 MG: 20 TABLET, FILM COATED ORAL at 07:05

## 2020-05-14 RX ADMIN — METHYLPREDNISOLONE SODIUM SUCCINATE 40 MG: 40 INJECTION, POWDER, FOR SOLUTION INTRAMUSCULAR; INTRAVENOUS at 12:05

## 2020-05-14 RX ADMIN — SPIRONOLACTONE 25 MG: 25 TABLET ORAL at 10:05

## 2020-05-14 RX ADMIN — CHLORHEXIDINE GLUCONATE 15 ML: 1.2 RINSE ORAL at 10:05

## 2020-05-14 RX ADMIN — FUROSEMIDE 40 MG: 10 INJECTION, SOLUTION INTRAMUSCULAR; INTRAVENOUS at 10:05

## 2020-05-14 RX ADMIN — ALLOPURINOL 100 MG: 100 TABLET ORAL at 10:05

## 2020-05-14 RX ADMIN — CEFTRIAXONE 1 G: 1 INJECTION, SOLUTION INTRAVENOUS at 04:05

## 2020-05-14 RX ADMIN — METHYLPREDNISOLONE SODIUM SUCCINATE 40 MG: 40 INJECTION, POWDER, FOR SOLUTION INTRAMUSCULAR; INTRAVENOUS at 10:05

## 2020-05-14 NOTE — ASSESSMENT & PLAN NOTE
It is probably a contributing factor to her heart failure.  Consider blood transfusions  Patient receiving 1 unit of red blood cell.

## 2020-05-14 NOTE — PLAN OF CARE
Dysphonia and increased risk for aspiration secondary to advanced age and compromised respiratory status. Ultimately MBS is recommended should Pt and team wish to rule out airway compromise and for subsequent potential for pulmonary complication.     Problem: SLP Goal  Goal: SLP Goal  Description  1. Pt will tolerate least restrictive PO diet without acute dysphagia pulmonary complication.   2. PENDING: Pt will participate in VFSS to define swallow physiology; treatment goal to follow    Outcome: Ongoing, Progressing

## 2020-05-14 NOTE — SUBJECTIVE & OBJECTIVE
Interval History: symptomatic anemia    Review of Systems   Constitutional: Negative.    HENT: Negative.    Eyes: Negative.    Respiratory: Positive for shortness of breath.    Cardiovascular: Negative.    Gastrointestinal: Negative.    Endocrine: Negative.    Genitourinary: Negative.    Musculoskeletal: Negative.    Skin: Negative.    Allergic/Immunologic: Negative.    Neurological: Negative.    Hematological: Negative.    All other systems reviewed and are negative.    Objective:     Vital Signs (Most Recent):  Temp: 97.5 °F (36.4 °C) (05/14/20 0721)  Pulse: 60 (05/14/20 0801)  Resp: 15 (05/14/20 0801)  BP: 137/65 (05/14/20 0721)  SpO2: 98 % (05/14/20 0801) Vital Signs (24h Range):  Temp:  [97.5 °F (36.4 °C)-98.4 °F (36.9 °C)] 97.5 °F (36.4 °C)  Pulse:  [60-69] 60  Resp:  [15-20] 15  SpO2:  [92 %-98 %] 98 %  BP: (119-150)/(57-68) 137/65     Weight: 64.4 kg (141 lb 15.6 oz)  Body mass index is 24.37 kg/m².    Intake/Output Summary (Last 24 hours) at 5/14/2020 1104  Last data filed at 5/14/2020 0600  Gross per 24 hour   Intake 300 ml   Output --   Net 300 ml      Physical Exam   Constitutional: She is oriented to person, place, and time. She appears well-developed and well-nourished.   HENT:   Head: Normocephalic and atraumatic.   Eyes: Pupils are equal, round, and reactive to light. Conjunctivae and EOM are normal.   Neck: Normal range of motion. Neck supple.   Cardiovascular: Normal rate, normal heart sounds and intact distal pulses.   irreg irreg   Pulmonary/Chest: Effort normal and breath sounds normal.   Abdominal: Soft. Bowel sounds are normal.   Musculoskeletal: Normal range of motion.   Neurological: She is alert and oriented to person, place, and time.   Skin: Skin is warm and dry. Capillary refill takes less than 2 seconds.   Psychiatric: She has a normal mood and affect. Her behavior is normal. Judgment and thought content normal.   Nursing note and vitals reviewed.      Significant Labs:   BMP:   Recent  Labs   Lab 05/14/20 0422   *      K 4.6      CO2 27   BUN 69*   CREATININE 1.6*   CALCIUM 9.5   MG 2.4     CBC:   Recent Labs   Lab 05/13/20  0736 05/14/20 0422   WBC 9.39  9.39 9.89   HGB 8.5*  8.5* 7.9*   HCT 26.8*  26.8* 24.8*     152 126*       Significant Imaging: I have reviewed and interpreted all pertinent imaging results/findings within the past 24 hours.

## 2020-05-14 NOTE — SUBJECTIVE & OBJECTIVE
Interval History:  She is feeling better today.  She would like to have oxygen at home.    Review of Systems   Constitution: Negative. Negative for fever and weight gain.   Cardiovascular: Negative.  Negative for chest pain and leg swelling.   Respiratory: Negative.  Negative for cough and shortness of breath.    Skin: Negative.  Negative for flushing and rash.   Musculoskeletal: Negative.  Negative for back pain and muscle cramps.   Gastrointestinal: Negative.  Negative for abdominal pain and constipation.   Neurological: Negative.  Negative for dizziness and weakness.   Psychiatric/Behavioral: Negative.  Negative for altered mental status and hallucinations.     Objective:     Vital Signs (Most Recent):  Temp: 97.7 °F (36.5 °C) (05/14/20 1423)  Pulse: 65 (05/14/20 1423)  Resp: 16 (05/14/20 1423)  BP: (!) 160/68 (05/14/20 1523)  SpO2: 97 % (05/14/20 1423) Vital Signs (24h Range):  Temp:  [97.5 °F (36.4 °C)-98.2 °F (36.8 °C)] 97.7 °F (36.5 °C)  Pulse:  [56-73] 65  Resp:  [15-18] 16  SpO2:  [95 %-98 %] 97 %  BP: (135-163)/(62-80) 160/68     Weight: 64.4 kg (141 lb 15.6 oz)  Body mass index is 24.37 kg/m².     SpO2: 97 %  O2 Device (Oxygen Therapy): High Flow nasal Cannula      Intake/Output Summary (Last 24 hours) at 5/14/2020 1548  Last data filed at 5/14/2020 0600  Gross per 24 hour   Intake 300 ml   Output --   Net 300 ml       Lines/Drains/Airways     Drain            Female External Urinary Catheter 05/11/20 1800 2 days          Peripheral Intravenous Line                 Peripheral IV - Single Lumen 05/11/20 1745 20 G Left Antecubital 2 days                Physical Exam   Constitutional: She is oriented to person, place, and time.   Elderly female in not acute distress on oxygen   HENT:   Head: Normocephalic and atraumatic.   Eyes: Conjunctivae and EOM are normal.   Cardiovascular: Normal rate and normal heart sounds. An irregular rhythm present.   Pulmonary/Chest: Effort normal and breath sounds normal.    Abdominal: Soft. Bowel sounds are normal.   Musculoskeletal: She exhibits no edema.   Neurological: She is alert and oriented to person, place, and time.   Skin: Skin is warm and dry.   Psychiatric: She has a normal mood and affect. Her behavior is normal. Judgment and thought content normal.   Nursing note and vitals reviewed.      Significant Labs:   BMP:   Recent Labs   Lab 05/13/20  0357 05/14/20  0422   *  155* 143*     142 140   K 4.3  4.3 4.6     108 106   CO2 26  26 27   BUN 48*  48* 69*   CREATININE 1.6*  1.6* 1.6*   CALCIUM 9.4  9.4 9.5   MG 2.4 2.4   , CMP   Recent Labs   Lab 05/13/20  0357 05/14/20  0422     142 140   K 4.3  4.3 4.6     108 106   CO2 26  26 27   *  155* 143*   BUN 48*  48* 69*   CREATININE 1.6*  1.6* 1.6*   CALCIUM 9.4  9.4 9.5   ANIONGAP 8  8 7*   ESTGFRAFRICA 31.3*  31.3* 31.3*   EGFRNONAA 27.2*  27.2* 27.2*   , CBC   Recent Labs   Lab 05/13/20  0736 05/14/20  0422   WBC 9.39  9.39 9.89   HGB 8.5*  8.5* 7.9*   HCT 26.8*  26.8* 24.8*     152 126*   , INR   Recent Labs   Lab 05/13/20  0357 05/13/20  0736 05/14/20  1117   INR 7.8* 7.7* 3.1    and Troponin No results for input(s): TROPONINI in the last 48 hours.    Significant Imaging: X-Ray: CXR: X-Ray Chest 1 View (CXR): No results found for this visit on 05/11/20.

## 2020-05-14 NOTE — PT/OT/SLP PROGRESS
"Speech Language Pathology Treatment    Patient Name:  Jillian Borrego   MRN:  [id number]  Admitting Diagnosis: Acute hypoxemic respiratory failure    Recommendations:                 General Recommendations:  Consider Otolaryngology consult to address onset of dysphonia ~2 months ago & MBS to rule out airway compromise which may result in pulmonary complication   Diet recommendations:  Regular, Liquid Diet Level: Thin   Aspiration Precautions: Standard aspiration precautions   General Precautions: Standard,    Communication strategies:  none    Subjective     "They're giving me blood"  Patient goals: none stated      Pain/Comfort:  · Pain Rating 1: 0/10    Objective:     Has the patient been evaluated by SLP for swallowing?   Yes  Keep patient NPO? No   Current Respiratory Status: nasal cannula      Pt upright in bed. Promotes age-related dysphagia w/ liquid. Immediate cough noted during 3 oz water challenge. Pt continues with short phrase length & mild dysphonia. Education provided on swallowing mechanism, age related changes in swallow function,  normalcy of aspiration given age with potential for pulmonary complication. Discussed conservative approach in dysphagia management with need for instrumental assessment to objectively identify and progress in POC vs implementing standard aspiration precautions, eliminating bedside symptoms and reducing risk for pulmonary complication w/ frequent oral hygiene. Pt wishes to proceed per MD with goal to "get better and go home".     Assessment:     Jillian Borrego is a 89 y.o. female with an SLP diagnosis of Dysphonia and increased risk for aspiration secondary to advanced age and compromised respiratory status. Ultimately MBS is recommended should Pt and team wish to rule out airway compromise and for subsequent potential for pulmonary complication.     Goals:   Multidisciplinary Problems     SLP Goals        Problem: SLP Goal    Goal Priority Disciplines Outcome   SLP Goal     SLP " Ongoing, Progressing   Description:  1. Pt will tolerate least restrictive PO diet without acute dysphagia pulmonary complication.   2. PENDING: Pt will participate in VFSS to define swallow physiology; treatment goal to follow                     Plan:       Time Tracking:     SLP Treatment Date:   05/14/20  Speech Start Time:  1120  Speech Stop Time:  1133     Speech Total Time (min):  13 min    Billable Minutes: Treatment Swallowing Dysfunction 13    Jayce Dunn CCC-SLP  05/14/2020

## 2020-05-14 NOTE — NURSING
Patient found having coughing spell while eating dinner- possible aspiration. Pt able to speak and answer questions, no apparent distress noted. Dr Dumont notified, stat chest x-ray ordered. Oxygen saturation maintained at 92-95% on 3 Liters O2  nasal cannula. Suction set up at bedside. Will continue to monitor.

## 2020-05-14 NOTE — PLAN OF CARE
05/14/20 0801   Patient Assessment/Suction   Level of Consciousness (AVPU) alert   All Lung Fields Breath Sounds crackles   PRE-TX-O2   O2 Device (Oxygen Therapy) High Flow nasal Cannula   $ Is the patient on Low Flow Oxygen? Yes   Flow (L/min) 3  (decreased to 2)   SpO2 98 %   Pulse Oximetry Type Intermittent   $ Pulse Oximetry - Multiple Charge Pulse Oximetry - Multiple   Pulse 60   Resp 15   Inhaler   $ Inhaler Charges MDI (Metered Dose Inahler) Treatment   Daily Review of Necessity (Inhaler) completed   Respiratory Treatment Status (Inhaler) given   Treatment Route (Inhaler) mouthpiece   Patient Position (Inhaler) semi-Jones's   Post Treatment Assessment (Inhaler) breath sounds unchanged   Signs of Intolerance (Inhaler) none   Breath Sounds Post-Respiratory Treatment   Post-treatment Heart Rate (beats/min) 60   Post-treatment Resp Rate (breaths/min) 15   Preset CPAP/BiPAP Settings   $ CPAP/BiPAP Daily Charge BiPAP/CPAP Daily   Respiratory Evaluation   $ Care Plan Tech Time 15 min

## 2020-05-14 NOTE — PROGRESS NOTES
"[facility]  Progress Note    Patient Name: Jillian Borrego  MRN: [id number]  Patient Class: IP- Inpatient   Admission Date: 5/11/2020  Length of Stay: 3 days  Attending Physician: Brenton Dumont MD  Primary Care Provider: Primary Doctor No        Subjective:     Principal Problem:Acute hypoxemic respiratory failure        HPI:  HPI per patient and per ER records  89-year-old female with COPD, chronic heart failure, hyperlipidemia, hypothyroidism, chronic atrial fibrillation on warfarin, CKD, advanced age, DNR comes in for shortness of breath and fall.  Per ER records, patient had at accidental trip and fell and struck her head.  EMS was called and patient appeared short of breath however patient refused to come to the ED.  Patient continued to have shortness of breath during the day and EMS was subsequently called again and brought patient to the ED for further evaluation.  During my interview, patient is short of breath and tachypneic saturating 84% on 5 L nasal cannula.  Able to speak and wanted to worsen sepsis.  Reports living home alone.  Her son and daughter come see her regularly.  Patient was then placed on BiPAP.  Denies fever, chills, chest pain.  Denies hemoptysis, hematuria, melena, hematochezia, gross bleeding.    In the ED, RR 30s, 84% on 5 L, H&H 8.8/28.4 (Baseline 12-13 in 2019), INR 5.3, Cr 1.5 (BL 1.3), , troponin 0.361,  UA with leukocytes and many bacteria, head CT unremarkable, chest x-ray concern for acute heart failure.    Overview/Hospital Course:  05/12 Discussed patient with Intensivist: stable for transfer to Cardiology unit. Patient feels "Better". Is able to speak in short sentences. Labs reviewed INR = 5.0; Stage 3 CKI    05/13 Labs reviewed INR = 7.7. Received vitamin K. Feels "Good". More alert today. Breathing more easily    05/14 Labs reviewed: No INR this AM, have ordered "Stat"; H/H has fallen again. 1 unit pRBC ordered, consent obtained. " Patient denies CP, but continues to have dyspnea.     Interval History: symptomatic anemia    Review of Systems   Constitutional: Negative.    HENT: Negative.    Eyes: Negative.    Respiratory: Positive for shortness of breath.    Cardiovascular: Negative.    Gastrointestinal: Negative.    Endocrine: Negative.    Genitourinary: Negative.    Musculoskeletal: Negative.    Skin: Negative.    Allergic/Immunologic: Negative.    Neurological: Negative.    Hematological: Negative.    All other systems reviewed and are negative.    Objective:     Vital Signs (Most Recent):  Temp: 97.5 °F (36.4 °C) (05/14/20 0721)  Pulse: 60 (05/14/20 0801)  Resp: 15 (05/14/20 0801)  BP: 137/65 (05/14/20 0721)  SpO2: 98 % (05/14/20 0801) Vital Signs (24h Range):  Temp:  [97.5 °F (36.4 °C)-98.4 °F (36.9 °C)] 97.5 °F (36.4 °C)  Pulse:  [60-69] 60  Resp:  [15-20] 15  SpO2:  [92 %-98 %] 98 %  BP: (119-150)/(57-68) 137/65     Weight: 64.4 kg (141 lb 15.6 oz)  Body mass index is 24.37 kg/m².    Intake/Output Summary (Last 24 hours) at 5/14/2020 1104  Last data filed at 5/14/2020 0600  Gross per 24 hour   Intake 300 ml   Output --   Net 300 ml      Physical Exam   Constitutional: She is oriented to person, place, and time. She appears well-developed and well-nourished.   HENT:   Head: Normocephalic and atraumatic.   Eyes: Pupils are equal, round, and reactive to light. Conjunctivae and EOM are normal.   Neck: Normal range of motion. Neck supple.   Cardiovascular: Normal rate, normal heart sounds and intact distal pulses.   irreg irreg   Pulmonary/Chest: Effort normal and breath sounds normal.   Abdominal: Soft. Bowel sounds are normal.   Musculoskeletal: Normal range of motion.   Neurological: She is alert and oriented to person, place, and time.   Skin: Skin is warm and dry. Capillary refill takes less than 2 seconds.   Psychiatric: She has a normal mood and affect. Her behavior is normal. Judgment and thought content normal.   Nursing note and  vitals reviewed.      Significant Labs:   BMP:   Recent Labs   Lab 05/14/20  0422   *      K 4.6      CO2 27   BUN 69*   CREATININE 1.6*   CALCIUM 9.5   MG 2.4     CBC:   Recent Labs   Lab 05/13/20  0736 05/14/20  0422   WBC 9.39  9.39 9.89   HGB 8.5*  8.5* 7.9*   HCT 26.8*  26.8* 24.8*     152 126*       Significant Imaging: I have reviewed and interpreted all pertinent imaging results/findings within the past 24 hours.      Assessment/Plan:      Congestive heart failure  Continue current regimen    DNR (do not resuscitate)        COPD exacerbation  Continue current regimen      Thrombocytopenia  Continue current regimen      CKD (chronic kidney disease)  Continue current regimen      Advanced age        Hypothyroidism  Continue current regimen      Hyperlipidemia    Continue current regimen    Hypertension  Continue current regimen      Permanent atrial fibrillation  Continue current regimen, except hold warfarin      Supratherapeutic INR  Continue hold warfarin      Symptomatic anemia  Transfuse 1 unit pRBC      Acute on chronic heart failure  Continue current regimen; check ECHO; transfuse 1 unit pRBC      COPD (chronic obstructive pulmonary disease)  Continue supplemental oxygen; continue current regimen        VTE Risk Mitigation (From admission, onward)         Ordered     IP VTE HIGH RISK PATIENT  Once      05/11/20 1503     Place sequential compression device  Until discontinued      05/11/20 1503                      Brenton Dumont MD  Department of Hospital Medicine   [facility]

## 2020-05-14 NOTE — ASSESSMENT & PLAN NOTE
Markedly increase INR.  Will give her subcutaneous vitamin K.  Give vitamin K again today.  As discussed she should be on Eliquis 2.5 mg twice a day or Xarelto 15 mg p.o. daily

## 2020-05-14 NOTE — ASSESSMENT & PLAN NOTE
Control on current medical therapy  Pressure is mildly elevated.  May need to increase the antihypertensives.   Self Administrated

## 2020-05-14 NOTE — PROGRESS NOTES
[facility]  Cardiology  Progress Note    Patient Name: Jillian Borrego  MRN: [id number]  Admission Date: 5/11/2020  Hospital Length of Stay: 3 days  Code Status: DNR   Attending Physician: Brenton Dumont MD   Primary Care Physician: Primary Doctor No  Expected Discharge Date:   Principal Problem:Acute hypoxemic respiratory failure    Subjective:     Hospital Course:   No notes on file    Interval History:  She is feeling better today.  She would like to have oxygen at home.    Review of Systems   Constitution: Negative. Negative for fever and weight gain.   Cardiovascular: Negative.  Negative for chest pain and leg swelling.   Respiratory: Negative.  Negative for cough and shortness of breath.    Skin: Negative.  Negative for flushing and rash.   Musculoskeletal: Negative.  Negative for back pain and muscle cramps.   Gastrointestinal: Negative.  Negative for abdominal pain and constipation.   Neurological: Negative.  Negative for dizziness and weakness.   Psychiatric/Behavioral: Negative.  Negative for altered mental status and hallucinations.     Objective:     Vital Signs (Most Recent):  Temp: 97.7 °F (36.5 °C) (05/14/20 1423)  Pulse: 65 (05/14/20 1423)  Resp: 16 (05/14/20 1423)  BP: (!) 160/68 (05/14/20 1523)  SpO2: 97 % (05/14/20 1423) Vital Signs (24h Range):  Temp:  [97.5 °F (36.4 °C)-98.2 °F (36.8 °C)] 97.7 °F (36.5 °C)  Pulse:  [56-73] 65  Resp:  [15-18] 16  SpO2:  [95 %-98 %] 97 %  BP: (135-163)/(62-80) 160/68     Weight: 64.4 kg (141 lb 15.6 oz)  Body mass index is 24.37 kg/m².     SpO2: 97 %  O2 Device (Oxygen Therapy): High Flow nasal Cannula      Intake/Output Summary (Last 24 hours) at 5/14/2020 1548  Last data filed at 5/14/2020 0600  Gross per 24 hour   Intake 300 ml   Output --   Net 300 ml       Lines/Drains/Airways     Drain            Female External Urinary Catheter 05/11/20 1800 2 days          Peripheral Intravenous Line                 Peripheral IV - Single Lumen 05/11/20 1745 20  G Left Antecubital 2 days                Physical Exam   Constitutional: She is oriented to person, place, and time.   Elderly female in not acute distress on oxygen   HENT:   Head: Normocephalic and atraumatic.   Eyes: Conjunctivae and EOM are normal.   Cardiovascular: Normal rate and normal heart sounds. An irregular rhythm present.   Pulmonary/Chest: Effort normal and breath sounds normal.   Abdominal: Soft. Bowel sounds are normal.   Musculoskeletal: She exhibits no edema.   Neurological: She is alert and oriented to person, place, and time.   Skin: Skin is warm and dry.   Psychiatric: She has a normal mood and affect. Her behavior is normal. Judgment and thought content normal.   Nursing note and vitals reviewed.      Significant Labs:   BMP:   Recent Labs   Lab 05/13/20  0357 05/14/20  0422   *  155* 143*     142 140   K 4.3  4.3 4.6     108 106   CO2 26  26 27   BUN 48*  48* 69*   CREATININE 1.6*  1.6* 1.6*   CALCIUM 9.4  9.4 9.5   MG 2.4 2.4   , CMP   Recent Labs   Lab 05/13/20  0357 05/14/20  0422     142 140   K 4.3  4.3 4.6     108 106   CO2 26  26 27   *  155* 143*   BUN 48*  48* 69*   CREATININE 1.6*  1.6* 1.6*   CALCIUM 9.4  9.4 9.5   ANIONGAP 8  8 7*   ESTGFRAFRICA 31.3*  31.3* 31.3*   EGFRNONAA 27.2*  27.2* 27.2*   , CBC   Recent Labs   Lab 05/13/20  0736 05/14/20  0422   WBC 9.39  9.39 9.89   HGB 8.5*  8.5* 7.9*   HCT 26.8*  26.8* 24.8*     152 126*   , INR   Recent Labs   Lab 05/13/20  0357 05/13/20  0736 05/14/20  1117   INR 7.8* 7.7* 3.1    and Troponin No results for input(s): TROPONINI in the last 48 hours.    Significant Imaging: X-Ray: CXR: X-Ray Chest 1 View (CXR): No results found for this visit on 05/11/20.    Assessment and Plan:         Thrombocytopenia  It has improved.    CKD (chronic kidney disease)  Stable    Hypertension  Control on current medical therapy  Pressure is mildly elevated.  May need to increase the  antihypertensives.    Permanent atrial fibrillation  With rate control.    Supratherapeutic INR  Markedly increase INR.  Will give her subcutaneous vitamin K.  Give vitamin K again today.  As discussed she should be on Eliquis 2.5 mg twice a day or Xarelto 15 mg p.o. daily    Symptomatic anemia  It is probably a contributing factor to her heart failure.  Consider blood transfusions  Patient receiving 1 unit of red blood cell.    Acute on chronic heart failure  Anemia is probably contributing factor to her heart failure.  Follow her hemoglobin hematocrit closely.  She might require blood transfusion to help her with her heart failure.    COPD (chronic obstructive pulmonary disease)  Stable        VTE Risk Mitigation (From admission, onward)         Ordered     IP VTE HIGH RISK PATIENT  Once      05/11/20 1503     Place sequential compression device  Until discontinued      05/11/20 1503                Sohan Rebolledo MD  Cardiology  [facility]

## 2020-05-15 LAB
ANION GAP SERPL CALC-SCNC: 8 MMOL/L (ref 8–16)
BASOPHILS # BLD AUTO: 0.01 K/UL (ref 0–0.2)
BASOPHILS # BLD AUTO: 0.01 K/UL (ref 0–0.2)
BASOPHILS NFR BLD: 0.1 % (ref 0–1.9)
BASOPHILS NFR BLD: 0.1 % (ref 0–1.9)
BNP SERPL-MCNC: 1415 PG/ML (ref 0–99)
BUN SERPL-MCNC: 79 MG/DL (ref 10–30)
CALCIUM SERPL-MCNC: 9.4 MG/DL (ref 8.7–10.5)
CHLORIDE SERPL-SCNC: 102 MMOL/L (ref 95–110)
CO2 SERPL-SCNC: 29 MMOL/L (ref 23–29)
CREAT SERPL-MCNC: 1.4 MG/DL (ref 0.5–1.4)
DIFFERENTIAL METHOD: ABNORMAL
DIFFERENTIAL METHOD: ABNORMAL
EOSINOPHIL # BLD AUTO: 0 K/UL (ref 0–0.5)
EOSINOPHIL # BLD AUTO: 0 K/UL (ref 0–0.5)
EOSINOPHIL NFR BLD: 0.1 % (ref 0–8)
EOSINOPHIL NFR BLD: 0.1 % (ref 0–8)
ERYTHROCYTE [DISTWIDTH] IN BLOOD BY AUTOMATED COUNT: 14.9 % (ref 11.5–14.5)
EST. GFR  (AFRICAN AMERICAN): 36.8 ML/MIN/1.73 M^2
EST. GFR  (NON AFRICAN AMERICAN): 32 ML/MIN/1.73 M^2
GLUCOSE SERPL-MCNC: 134 MG/DL (ref 70–110)
HCT VFR BLD AUTO: 28.8 % (ref 37–48.5)
HGB BLD-MCNC: 9.2 G/DL (ref 12–16)
IMM GRANULOCYTES # BLD AUTO: 0.12 K/UL (ref 0–0.04)
IMM GRANULOCYTES # BLD AUTO: 0.12 K/UL (ref 0–0.04)
IMM GRANULOCYTES NFR BLD AUTO: 1.4 % (ref 0–0.5)
IMM GRANULOCYTES NFR BLD AUTO: 1.4 % (ref 0–0.5)
INR PPP: 1.7
INR PPP: 1.7
LYMPHOCYTES # BLD AUTO: 0.3 K/UL (ref 1–4.8)
LYMPHOCYTES # BLD AUTO: 0.3 K/UL (ref 1–4.8)
LYMPHOCYTES NFR BLD: 3.1 % (ref 18–48)
LYMPHOCYTES NFR BLD: 3.1 % (ref 18–48)
MAGNESIUM SERPL-MCNC: 2.4 MG/DL (ref 1.6–2.6)
MCH RBC QN AUTO: 31.9 PG (ref 27–31)
MCHC RBC AUTO-ENTMCNC: 31.9 G/DL (ref 32–36)
MCV RBC AUTO: 100 FL (ref 82–98)
MONOCYTES # BLD AUTO: 0.2 K/UL (ref 0.3–1)
MONOCYTES # BLD AUTO: 0.2 K/UL (ref 0.3–1)
MONOCYTES NFR BLD: 1.8 % (ref 4–15)
MONOCYTES NFR BLD: 1.8 % (ref 4–15)
NEUTROPHILS # BLD AUTO: 7.8 K/UL (ref 1.8–7.7)
NEUTROPHILS # BLD AUTO: 7.8 K/UL (ref 1.8–7.7)
NEUTROPHILS NFR BLD: 93.5 % (ref 38–73)
NEUTROPHILS NFR BLD: 93.5 % (ref 38–73)
NRBC BLD-RTO: 0 /100 WBC
NRBC BLD-RTO: 0 /100 WBC
PHOSPHATE SERPL-MCNC: 4.2 MG/DL (ref 2.7–4.5)
PLATELET # BLD AUTO: 111 K/UL (ref 150–350)
PMV BLD AUTO: 11.9 FL (ref 9.2–12.9)
POTASSIUM SERPL-SCNC: 4.3 MMOL/L (ref 3.5–5.1)
PROTHROMBIN TIME: 19.6 SEC (ref 10.6–14.8)
PROTHROMBIN TIME: 19.6 SEC (ref 10.6–14.8)
RBC # BLD AUTO: 2.88 M/UL (ref 4–5.4)
SODIUM SERPL-SCNC: 139 MMOL/L (ref 136–145)
WBC # BLD AUTO: 8.39 K/UL (ref 3.9–12.7)

## 2020-05-15 PROCEDURE — 63600175 PHARM REV CODE 636 W HCPCS: Performed by: INTERNAL MEDICINE

## 2020-05-15 PROCEDURE — 83735 ASSAY OF MAGNESIUM: CPT

## 2020-05-15 PROCEDURE — 27000221 HC OXYGEN, UP TO 24 HOURS

## 2020-05-15 PROCEDURE — 25000003 PHARM REV CODE 250: Performed by: INTERNAL MEDICINE

## 2020-05-15 PROCEDURE — 36415 COLL VENOUS BLD VENIPUNCTURE: CPT

## 2020-05-15 PROCEDURE — 94761 N-INVAS EAR/PLS OXIMETRY MLT: CPT

## 2020-05-15 PROCEDURE — 94640 AIRWAY INHALATION TREATMENT: CPT

## 2020-05-15 PROCEDURE — 84100 ASSAY OF PHOSPHORUS: CPT

## 2020-05-15 PROCEDURE — 94618 PULMONARY STRESS TESTING: CPT

## 2020-05-15 PROCEDURE — 97116 GAIT TRAINING THERAPY: CPT

## 2020-05-15 PROCEDURE — 97161 PT EVAL LOW COMPLEX 20 MIN: CPT

## 2020-05-15 PROCEDURE — 83880 ASSAY OF NATRIURETIC PEPTIDE: CPT

## 2020-05-15 PROCEDURE — 80048 BASIC METABOLIC PNL TOTAL CA: CPT

## 2020-05-15 PROCEDURE — 97535 SELF CARE MNGMENT TRAINING: CPT

## 2020-05-15 PROCEDURE — 85025 COMPLETE CBC W/AUTO DIFF WBC: CPT

## 2020-05-15 PROCEDURE — 99900035 HC TECH TIME PER 15 MIN (STAT)

## 2020-05-15 PROCEDURE — 21400001 HC TELEMETRY ROOM

## 2020-05-15 PROCEDURE — 97167 OT EVAL HIGH COMPLEX 60 MIN: CPT

## 2020-05-15 PROCEDURE — 97530 THERAPEUTIC ACTIVITIES: CPT

## 2020-05-15 PROCEDURE — 85610 PROTHROMBIN TIME: CPT

## 2020-05-15 RX ORDER — PREDNISONE 20 MG/1
20 TABLET ORAL DAILY
Status: DISCONTINUED | OUTPATIENT
Start: 2020-05-15 | End: 2020-05-16 | Stop reason: HOSPADM

## 2020-05-15 RX ORDER — PANTOPRAZOLE SODIUM 40 MG/1
40 TABLET, DELAYED RELEASE ORAL
Status: DISCONTINUED | OUTPATIENT
Start: 2020-05-15 | End: 2020-05-16 | Stop reason: HOSPADM

## 2020-05-15 RX ORDER — PREDNISONE 5 MG/1
10 TABLET ORAL DAILY
Status: DISCONTINUED | OUTPATIENT
Start: 2020-05-19 | End: 2020-05-16 | Stop reason: HOSPADM

## 2020-05-15 RX ADMIN — METOPROLOL SUCCINATE 12.5 MG: 25 TABLET, EXTENDED RELEASE ORAL at 09:05

## 2020-05-15 RX ADMIN — CHLORHEXIDINE GLUCONATE 15 ML: 1.2 RINSE ORAL at 09:05

## 2020-05-15 RX ADMIN — ALLOPURINOL 100 MG: 100 TABLET ORAL at 09:05

## 2020-05-15 RX ADMIN — SPIRONOLACTONE 25 MG: 25 TABLET ORAL at 09:05

## 2020-05-15 RX ADMIN — METHYLPREDNISOLONE SODIUM SUCCINATE 40 MG: 40 INJECTION, POWDER, FOR SOLUTION INTRAMUSCULAR; INTRAVENOUS at 06:05

## 2020-05-15 RX ADMIN — AMLODIPINE BESYLATE 5 MG: 5 TABLET ORAL at 09:05

## 2020-05-15 RX ADMIN — PREDNISONE 20 MG: 20 TABLET ORAL at 10:05

## 2020-05-15 RX ADMIN — FLUTICASONE FUROATE AND VILANTEROL TRIFENATATE 1 PUFF: 100; 25 POWDER RESPIRATORY (INHALATION) at 02:05

## 2020-05-15 RX ADMIN — FUROSEMIDE 40 MG: 10 INJECTION, SOLUTION INTRAMUSCULAR; INTRAVENOUS at 09:05

## 2020-05-15 RX ADMIN — CEFTRIAXONE 1 G: 1 INJECTION, SOLUTION INTRAVENOUS at 04:05

## 2020-05-15 RX ADMIN — CHLORHEXIDINE GLUCONATE 15 ML: 1.2 RINSE ORAL at 08:05

## 2020-05-15 RX ADMIN — LEVOTHYROXINE SODIUM 88 MCG: 88 TABLET ORAL at 05:05

## 2020-05-15 RX ADMIN — PANTOPRAZOLE SODIUM 40 MG: 40 TABLET, DELAYED RELEASE ORAL at 09:05

## 2020-05-15 RX ADMIN — SIMVASTATIN 20 MG: 20 TABLET, FILM COATED ORAL at 08:05

## 2020-05-15 NOTE — PROGRESS NOTES
"[facility] Medicine  Progress Note    Patient Name: Jillian Borrego  MRN: [id number]  Patient Class: IP- Inpatient   Admission Date: 5/11/2020  Length of Stay: 4 days  Attending Physician: Brenton Dumont MD  Primary Care Provider: Primary Doctor No        Subjective:     Principal Problem:Acute hypoxemic respiratory failure        HPI:  HPI per patient and per ER records  89-year-old female with COPD, chronic heart failure, hyperlipidemia, hypothyroidism, chronic atrial fibrillation on warfarin, CKD, advanced age, DNR comes in for shortness of breath and fall.  Per ER records, patient had at accidental trip and fell and struck her head.  EMS was called and patient appeared short of breath however patient refused to come to the ED.  Patient continued to have shortness of breath during the day and EMS was subsequently called again and brought patient to the ED for further evaluation.  During my interview, patient is short of breath and tachypneic saturating 84% on 5 L nasal cannula.  Able to speak and wanted to worsen sepsis.  Reports living home alone.  Her son and daughter come see her regularly.  Patient was then placed on BiPAP.  Denies fever, chills, chest pain.  Denies hemoptysis, hematuria, melena, hematochezia, gross bleeding.    In the ED, RR 30s, 84% on 5 L, H&H 8.8/28.4 (Baseline 12-13 in 2019), INR 5.3, Cr 1.5 (BL 1.3), , troponin 0.361,  UA with leukocytes and many bacteria, head CT unremarkable, chest x-ray concern for acute heart failure.    Overview/Hospital Course:  05/12 Discussed patient with Intensivist: stable for transfer to Cardiology unit. Patient feels "Better". Is able to speak in short sentences. Labs reviewed INR = 5.0; Stage 3 CKI    05/13 Labs reviewed INR = 7.7. Received vitamin K. Feels "Good". More alert today. Breathing more easily    05/14 Labs reviewed: No INR this AM, have ordered "Stat"; H/H has fallen again. 1 unit pRBC ordered, consent obtained. " "Patient denies CP, but continues to have dyspnea.     05/15  Nursing states "Dark" stool this AM. Order for FOB is in. Lab reviewed: H/H good after transfusion; INR 1.7 Patient states she feels "Good". Patient's sister is in town to be with her when discharged (reportedly the sister is a "Good" 70 year old).    Interval History: improving    Review of Systems   Constitutional: Negative.    HENT: Negative.    Eyes: Negative.    Respiratory: Negative.    Cardiovascular: Negative.    Gastrointestinal: Negative.    Endocrine: Negative.    Genitourinary: Negative.    Musculoskeletal: Negative.    Skin: Negative.    Allergic/Immunologic: Negative.    Neurological: Negative.    Hematological: Negative.    All other systems reviewed and are negative.    Objective:     Vital Signs (Most Recent):  Temp: 97.6 °F (36.4 °C) (05/15/20 1112)  Pulse: 67 (05/15/20 1112)  Resp: 20 (05/15/20 1112)  BP: 138/62 (05/15/20 1112)  SpO2: 99 % (05/15/20 1112) Vital Signs (24h Range):  Temp:  [97.6 °F (36.4 °C)-98 °F (36.7 °C)] 97.6 °F (36.4 °C)  Pulse:  [55-73] 67  Resp:  [16-20] 20  SpO2:  [96 %-99 %] 99 %  BP: (119-163)/(60-69) 138/62     Weight: 66.1 kg (145 lb 11.6 oz)  Body mass index is 25.01 kg/m².    Intake/Output Summary (Last 24 hours) at 5/15/2020 1319  Last data filed at 5/15/2020 0845  Gross per 24 hour   Intake 530 ml   Output --   Net 530 ml      Physical Exam   Constitutional: She is oriented to person, place, and time. She appears well-developed and well-nourished.   HENT:   Head: Normocephalic and atraumatic.   Eyes: Pupils are equal, round, and reactive to light. Conjunctivae and EOM are normal.   Neck: Normal range of motion. Neck supple.   Cardiovascular: Normal rate, regular rhythm, normal heart sounds and intact distal pulses.   Pulmonary/Chest: Effort normal and breath sounds normal.   Decreased entry without adventitious sounds   Abdominal: Soft. Bowel sounds are normal.   Musculoskeletal: Normal range of motion. "   Neurological: She is alert and oriented to person, place, and time.   Skin: Skin is warm and dry. Capillary refill takes less than 2 seconds.   Psychiatric: She has a normal mood and affect. Her behavior is normal. Judgment and thought content normal.   Nursing note and vitals reviewed.      Significant Labs:   BMP:   Recent Labs   Lab 05/15/20  0431   *  134*  134*     139  139   K 4.3  4.3  4.3     102  102   CO2 29  29  29   BUN 79*  79*  79*   CREATININE 1.4  1.4  1.4   CALCIUM 9.4  9.4  9.4   MG 2.4     CBC:   Recent Labs   Lab 05/14/20  0422 05/15/20  0431   WBC 9.89 8.39  8.39  8.39   HGB 7.9* 9.2*  9.2*  9.2*   HCT 24.8* 28.8*  28.8*  28.8*   * 111*  111*  111*       Significant Imaging: I have reviewed and interpreted all pertinent imaging results/findings within the past 24 hours.      Assessment/Plan:      Congestive heart failure  Continue current regimen    DNR (do not resuscitate)        COPD exacerbation  Continue current regimen      Thrombocytopenia  Continue current regimen      CKD (chronic kidney disease)  Continue current regimen      Advanced age        Hypothyroidism  Continue current regimen      Hyperlipidemia    Continue current regimen    Hypertension  Continue current regimen      Permanent atrial fibrillation  Continue current regimen, except hold warfarin      Supratherapeutic INR  Continue hold warfarin      Symptomatic anemia  Transfuse 1 unit pRBC      Acute on chronic heart failure  Continue current regimen; check ECHO; transfuse 1 unit pRBC      COPD (chronic obstructive pulmonary disease)  Continue supplemental oxygen; continue current regimen        VTE Risk Mitigation (From admission, onward)         Ordered     IP VTE HIGH RISK PATIENT  Once      05/11/20 1503     Place sequential compression device  Until discontinued      05/11/20 1503                      Brenton Dumont MD  Department of Hospital Medicine   [place]  [facility]

## 2020-05-15 NOTE — PT/OT/SLP EVAL
"Occupational Therapy   Evaluation    Name: Jillian Borrego  MRN: [id number]  Admitting Diagnosis:  Acute hypoxemic respiratory failure      Recommendations:     Discharge Recommendations: home, home with home health, home health PT, home health OT  Discharge Equipment Recommendations:  none  Barriers to discharge:  None(sister is reportedly going home with the patient )    Assessment:     Jillian Borrego is a 89 y.o. female with a medical diagnosis of Acute hypoxemic respiratory failure.  She presents with readiness and willingness to participate in OT session. Performance deficits affecting function: weakness, impaired endurance, impaired self care skills, impaired functional mobilty, impaired cardiopulmonary response to activity, gait instability, decreased upper extremity function, impaired balance, decreased lower extremity function.      Rehab Prognosis: Good; patient would benefit from acute skilled OT services to address these deficits and reach maximum level of function.       Plan:     Patient to be seen 5 x/week to address the above listed problems via self-care/home management, therapeutic activities, therapeutic exercises  · Plan of Care Expires: 06/15/20  · Plan of Care Reviewed with: patient    Subjective     Chief Complaint: "I haven't been out the bed but one time since I've been here so I might be weak"  Patient/Family Comments/goals: home with sister    Occupational Profile:  Living Environment: one story house with one step to enter, walk in shower with shower chair,wall bar   Previous level of function: Mod I with rollator in her home, RW in the car with her family; lives alone  Roles and Routines: pt lives alone,   Equipment Used at Home:  walker, rolling, rollator, shower chair  Assistance upon Discharge: pt is expected to go home with sister helping her     Pain/Comfort:  · Pain Rating 1: 0/10  · Pain Rating Post-Intervention 1: 0/10    Patients cultural, spiritual, Denominational conflicts given the " current situation: no    Objective:     Communicated with: Nursing prior to session.  Patient found HOB elevated with telemetry, peripheral IV upon OT entry to room.    General Precautions: Standard, aspiration, fall   Orthopedic Precautions:N/A   Braces: N/A     Occupational Performance:    Bed Mobility:    · Patient completed Rolling/Turning to Left with  stand by assistance  · Patient completed Scooting/Bridging with supervision  · Patient completed Supine to Sit with stand by assistance  · Patient completed Sit to Supine with stand by assistance    Functional Mobility/Transfers:  · Patient completed Sit <> Stand Transfer with minimum assistance  with  rolling walker   · Patient completed Bed <> Chair Transfer using Step Transfer and with cues for hand placement needed technique with minimum assistance with rolling walker  · Patient completed Toilet Transfer Step Transfer and with cues for hand placement needed  technique with minimum assistance with  rolling walker and use of wall bar   · Functional Mobility: room ambulation with RW from bed to bathroom to transfer to the toilet x ~ 15 feet with Min A and 2 episodes of crossing her feet/ankles when she ambulated from the bed to the bathroom, however, during bathroom to bed ambulation the pt was noted to not cross her ankles during this trial of ambulation x 15 feet, however, unsteadiness and cues needed for safety    Activities of Daily Living:  · Grooming: standing at the sink for hand washing with CG with no cues needed to reach and complete the task; pt declined denture care    · Bathing: light sponge bath seated on toilet as pt was found soiled with urine; pt washed self with CG needed during standing phase of periarea hygiene  · Toileting: minimum assistance for gown mgt , pt was able to complete hygiene; OT changed mepilex and brief and OT informed RN of this care     Cognitive/Visual Perceptual:  Cognitive/Psychosocial Skills:     -       Oriented to:  Person, Place, Time and Situation   -       Follows Commands/attention:Follows two-step commands  -       Communication: clear/fluent  -       Memory: No Deficits noted  -       Safety awareness/insight to disability: intact   -       Mood/Affect/Coping skills/emotional control: Appropriate to situation, Cooperative and Pleasant  Visual/Perceptual:      -Impaired  acuity hearing impaired, has hearing aides however, the pt states they are broken    Physical Exam:  Balance: -       static sitting balance fair w/ slight posterior losing balance however, not complete LOB; static standing poor +   Skin integrity: Visible skin intact  Dominant hand: -       right  Upper Extremity Range of Motion:     -       Right Upper Extremity: WFL except AROM 90* shoulder flexion, PROM ~ 75% range due to RTC surgery   -       Left Upper Extremity: WFL except AROM 90* shoulder flexion, PROM WFL   Upper Extremity Strength:    -       Right Upper Extremity: WFL except 4/5 shoulders   -       Left Upper Extremity: WFL except 4/5 shoulders   Strength:    -       Right Upper Extremity: WFL  -       Left Upper Extremity: WFL  Fine Motor Coordination:    -       Intact  Gross motor coordination:   WFL    AMPAC 6 Click ADL:  AMPAC Total Score: 16    Treatment & Education: role of OT; call don't fall, hand placement with RW use, remaining within the RW as pt is used to using a rollator in the home; balance training during g/h at the sink   Education:    Patient left HOB elevated with all lines intact, call button in reach, bed alarm on and nurse  notified    GOALS:   Multidisciplinary Problems     Occupational Therapy Goals        Problem: Occupational Therapy Goal    Goal Priority Disciplines Outcome Interventions   Occupational Therapy Goal     OT, PT/OT     Description:  Goals to be met by: discharge    Patient will increase functional independence with ADLs by performing:    UE Dressing with Supervision.  LE Dressing with Stand-by  Assistance.  Grooming while seated with Modified [place].  Toileting from toilet with Supervision for hygiene and clothing management.   Bathing from  sitting at sink with Stand-by Assistance.  Toilet transfer to toilet with Stand-by Assistance.                      History:     Past Medical History:   Diagnosis Date    Afib     Hx of stroke without residual deficits     Hypothyroid     Rheumatoid arthritis        Past Surgical History:   Procedure Laterality Date    RIGHT OOPHORECTOMY      ROTATOR CUFF REPAIR Right        Time Tracking:     OT Date of Treatment: 05/15/20  OT Start Time: 1310  OT Stop Time: 1340  OT Total Time (min): 30 min    Billable Minutes:Evaluation 7  Self Care/Home Management 10  Therapeutic Activity 13    Izabella Krueegr OT  5/15/27933:16 PM

## 2020-05-15 NOTE — ASSESSMENT & PLAN NOTE
Stable improved.  She is on high doses of steroids.  Will discontinue the IV steroids and change to oral

## 2020-05-15 NOTE — PLAN OF CARE
05/15/20 1410   Room Air SpO2 At Rest   Room Air SpO2 At Rest 92 %   Exertional SpO2 Evaluation on Room Air   Room Air SpO2 on Exertion (!) 84 %   Exertional SpO2 Evaluation on O2   SpO2 During Exertion on O2 93 %   Heart Rate on O2 84 bpm   Exertion O2 LPM 2 LPM   SpO2 On Recovery   SpO2 on Recovery 92 %   Recovery Heart Rate 80 bpm   Recovery O2 LPM 2 LPM   Home O2 Eval Comments Patient meets criteria for home Oxygen

## 2020-05-15 NOTE — SUBJECTIVE & OBJECTIVE
Interval History:  She is feeling better today sitting at bedside.  Continues to have mild shortness of breath.  During the night she showed on a piece of meat and the nurses came in running.  Review of the monitor strip she is in atrial fibrillation with rates down to the 30s.  Will decrease the doses of metoprolol.    Review of Systems   Constitution: Negative. Negative for fever and weight gain.   Cardiovascular: Negative.  Negative for chest pain and leg swelling.   Respiratory: Positive for shortness of breath. Negative for cough.    Skin: Negative.  Negative for flushing and rash.   Musculoskeletal: Negative.  Negative for back pain and muscle cramps.   Gastrointestinal: Negative.  Negative for abdominal pain and constipation.   Neurological: Negative.  Negative for dizziness and weakness.   Psychiatric/Behavioral: Negative.  Negative for altered mental status and hallucinations.     Objective:     Vital Signs (Most Recent):  Temp: 97.9 °F (36.6 °C) (05/15/20 0734)  Pulse: 61 (05/15/20 0734)  Resp: 20 (05/15/20 0734)  BP: (!) 142/62 (05/15/20 0734)  SpO2: 96 % (05/15/20 0734) Vital Signs (24h Range):  Temp:  [97.6 °F (36.4 °C)-98 °F (36.7 °C)] 97.9 °F (36.6 °C)  Pulse:  [55-73] 61  Resp:  [16-20] 20  SpO2:  [96 %-98 %] 96 %  BP: (119-163)/(60-80) 142/62     Weight: 66.1 kg (145 lb 11.6 oz)  Body mass index is 25.01 kg/m².     SpO2: 96 %  O2 Device (Oxygen Therapy): High Flow nasal Cannula      Intake/Output Summary (Last 24 hours) at 5/15/2020 0859  Last data filed at 5/14/2020 1600  Gross per 24 hour   Intake 290 ml   Output --   Net 290 ml       Lines/Drains/Airways     Drain            Female External Urinary Catheter 05/11/20 1800 3 days          Peripheral Intravenous Line                 Peripheral IV - Single Lumen 05/11/20 1745 20 G Left Antecubital 3 days                Physical Exam   Constitutional: She is oriented to person, place, and time. She appears well-developed and well-nourished.   HENT:    Head: Normocephalic and atraumatic.   Eyes: Conjunctivae and EOM are normal.   Cardiovascular: Normal rate. An irregular rhythm present.   Murmur (Grade 2/6 systolic murmur at the base) heard.  Pulmonary/Chest: Effort normal. She has rales.   Abdominal: Soft. Bowel sounds are normal.   Musculoskeletal: She exhibits no edema.   Neurological: She is alert and oriented to person, place, and time.   Skin: Skin is warm and dry.   Psychiatric: She has a normal mood and affect. Her behavior is normal. Judgment and thought content normal.   Nursing note and vitals reviewed.      Significant Labs:   BMP:   Recent Labs   Lab 05/14/20  0422 05/15/20  0431   * 134*  134*  134*    139  139  139   K 4.6 4.3  4.3  4.3    102  102  102   CO2 27 29 29  29   BUN 69* 79*  79*  79*   CREATININE 1.6* 1.4  1.4  1.4   CALCIUM 9.5 9.4  9.4  9.4   MG 2.4 2.4   , CMP   Recent Labs   Lab 05/14/20  0422 05/15/20  0431    139  139  139   K 4.6 4.3  4.3  4.3    102  102  102   CO2 27 29 29  29   * 134*  134*  134*   BUN 69* 79*  79*  79*   CREATININE 1.6* 1.4  1.4  1.4   CALCIUM 9.5 9.4  9.4  9.4   ANIONGAP 7* 8  8  8   ESTGFRAFRICA 31.3* 36.8*  36.8*  36.8*   EGFRNONAA 27.2* 32.0*  32.0*  32.0*   , CBC   Recent Labs   Lab 05/14/20  0422 05/15/20  0431   WBC 9.89 8.39  8.39  8.39   HGB 7.9* 9.2*  9.2*  9.2*   HCT 24.8* 28.8*  28.8*  28.8*   * 111*  111*  111*    and Troponin No results for input(s): TROPONINI in the last 48 hours.  BNP  Recent Labs   Lab 05/15/20  0431   BNP 1,415*     Significant Imaging: X-Ray: CXR: X-Ray Chest PA and Lateral (CXR): No results found for this visit on 05/11/20.

## 2020-05-15 NOTE — ASSESSMENT & PLAN NOTE
Reversed with subcutaneous vitamin K. as discussed the patient will benefit from Eliquis 2.5 b.i.d..  Probably should be started in few days once the H and H has been stable

## 2020-05-15 NOTE — PROGRESS NOTES
"[facility]  Adult Nutrition   Progress Note (Follow-Up)    SUMMARY     Recommendations/Interventions:    Recommendation/Intervention: 1. Continuue current diet as tolerated, encourage intake. 2.  to assist in meal choices daily.  Goals: 1. Patient to meet at least 75% of estimated needs via PO intake of meals.  Nutrition Goal Status: new    Dietitian Rounds Brief:  · Seen 2' f/u. Per RN: "Patient found having coughing spell while eating dinner- possible aspiration. Pt able to speak and answer questions, no apparent distress noted. " SLP rec regular consistency. Patient eating breakfast without difficulty this morning-good intake. No complaints.  Reason for Assessment  Reason For Assessment: RD follow-up  Relevant Medical History: COPD, A-fib, HTN, advanced agem CHF, sympotamtic anemia, Hx stroke w/o residual deficits    Nutrition Risk Screen  Nutrition Risk Screen: no indicators present    Nutrition/Diet History  Spiritual, Cultural Beliefs, Druze Practices, Values that Affect Care: no  Food Allergies: NKFA  Factors Affecting Nutritional Intake: None identified at this time    Anthropometrics  Temp: 97.9 °F (36.6 °C)  Height Method: Stated  Height: 5' 4" (162.6 cm)  Height (inches): 64 in  Weight Method: Bed Scale  Weight: 66.1 kg (145 lb 11.6 oz)  Weight (lb): 145.73 lb  Ideal Body Weight (IBW), Female: 120 lb  % Ideal Body Weight, Female (lb): 118.5 %  BMI (Calculated): 25  BMI Grade: 25 - 29.9 - overweight     Weight History:  Wt Readings from Last 10 Encounters:   05/15/20 66.1 kg (145 lb 11.6 oz)   05/12/20 64.5 kg (142 lb 3.2 oz)   10/16/17 62.1 kg (137 lb)   01/07/11 71.4 kg (157 lb 7.2 oz)     Lab/Procedures/Meds: Pertinent Labs Reviewed  Clinical Chemistry:  Recent Labs   Lab 05/11/20  1220 05/13/20  0357 05/14/20  0422 05/15/20  0431    142  142 140 139  139  139   K 4.4 4.3  4.3 4.6 4.3  4.3  4.3    108  108 106 102  102  102   CO2 26 26  26 27 29  29  29 "   * 155*  155* 143* 134*  134*  134*   BUN 37* 48*  48* 69* 79*  79*  79*   CREATININE 1.5* 1.6*  1.6* 1.6* 1.4  1.4  1.4   CALCIUM 9.5 9.4  9.4 9.5 9.4  9.4  9.4   PROT 6.8  --   --   --    ALBUMIN 3.7  --   --   --    BILITOT 1.2*  --   --   --    ALKPHOS 65  --   --   --    AST 25  --   --   --    ALT 16  --   --   --    ANIONGAP 7* 8  8 7* 8  8  8   ESTGFRAFRICA 33.9* 31.3*  31.3* 31.3* 36.8*  36.8*  36.8*   EGFRNONAA 29.4* 27.2*  27.2* 27.2* 32.0*  32.0*  32.0*   MG 2.3 2.4 2.4 2.4   PHOS 4.0 3.6 3.6 4.2    < > = values in this interval not displayed.     CBC:   Recent Labs   Lab 05/15/20  0431   WBC 8.39  8.39  8.39   RBC 2.88*  2.88*  2.88*   HGB 9.2*  9.2*  9.2*   HCT 28.8*  28.8*  28.8*   *  111*  111*   *  100*  100*   MCH 31.9*  31.9*  31.9*   MCHC 31.9*  31.9*  31.9*     Cardiac Profile:  Recent Labs   Lab 05/11/20  1220 05/11/20  1801 05/12/20  0347 05/13/20  0357 05/15/20  0431   *  892* 1,222*  --  1,204* 1,415*   TROPONINI 0.361* 1.130* 1.201*  --   --      Medications: Pertinent Medications reviewed  Scheduled Meds:   allopurinoL  100 mg Oral Daily    amLODIPine  5 mg Oral Daily    cefTRIAXone (ROCEPHIN) IVPB  1 g Intravenous Q24H    chlorhexidine  15 mL Mouth/Throat BID    fluticasone furoate-vilanteroL  1 puff Inhalation Daily    furosemide (LASIX) IV  40 mg Intravenous Daily    levothyroxine  88 mcg Oral Daily    methylPREDNISolone sodium succinate  40 mg Intravenous Q6H    metoprolol succinate  25 mg Oral Daily    mupirocin   Nasal BID    pantoprazole  40 mg Oral Before breakfast    simvastatin  20 mg Oral QHS    spironolactone  25 mg Oral Daily     Continuous Infusions:  PRN Meds:.sodium chloride, acetaminophen, acetaminophen, albuterol-ipratropium, calcium chloride IVPB, calcium chloride IVPB, calcium chloride IVPB, magnesium oxide, magnesium sulfate IVPB, magnesium sulfate IVPB, magnesium sulfate IVPB, magnesium  sulfate IVPB, melatonin, ondansetron, polyethylene glycol, potassium chloride in water, potassium chloride in water, potassium chloride in water, potassium chloride in water, potassium chloride, potassium chloride, potassium chloride, potassium chloride, sodium chloride 0.9%, sodium phosphate IVPB, sodium phosphate IVPB, sodium phosphate IVPB, sodium phosphate IVPB, sodium phosphate IVPB    Estimated/Assessed Needs    Weight Used For Calorie Calculations: 64.5 kg (142 lb 3.2 oz)  Energy Calorie Requirements (kcal): 8510-9644 kcals/day (20-25 kcals/kg)  Energy Need Method: Kcal/kg  Protein Requirements: 51-65 g/day (0.8-1.0 g/kg)  Weight Used For Protein Calculations: 64.5 kg (142 lb 3.2 oz)     Estimated Fluid Requirement Method: RDA Method    Nutrition Prescription Ordered    Current Diet Order: Cardiac Diet     Evaluation of Received Nutrient/Fluid Intake    Energy Calories Required: meeting needs  Protein Required: meeting needs  Fluid Required: meeting needs  Tolerance: tolerating  % Intake of Estimated Energy Needs: 75 - 100 %   % Meal Intake: 75 - 100 %    Intake/Output Summary (Last 24 hours) at 5/15/2020 0856  Last data filed at 5/14/2020 1600  Gross per 24 hour   Intake 290 ml   Output --   Net 290 ml      Nutrition Risk    Level of Risk/Frequency of Follow-up: moderate   Monitor and Evaluation    Food and Nutrient Intake: energy intake, food and beverage intake  Food and Nutrient Adminstration: diet order  Knowledge/Beliefs/Attitudes: beliefs and attitudes, food and nutrition knowledge/skill  Physical Activity and Function: nutrition-related ADLs and IADLs, factors affecting access to physical activity  Anthropometric Measurements: weight, weight change  Biochemical Data, Medical Tests and Procedures: electrolyte and renal panel, inflammatory profile, lipid profile, gastrointestinal profile, glucose/endocrine profile  Nutrition-Focused Physical Findings: overall appearance     Nutrition Follow-Up    RD  Follow-up?: Yes  Nuria Yeung RD 05/15/2020 8:58 AM

## 2020-05-15 NOTE — ASSESSMENT & PLAN NOTE
Stable  Creatinine stable the BUN is going up suggesting of GI bleeding.  Will get stool for occult blood.

## 2020-05-15 NOTE — PLAN OF CARE
05/14/20 2103   Patient Assessment/Suction   Level of Consciousness (AVPU) alert   Respiratory Effort Normal;Unlabored   Expansion/Accessory Muscles/Retractions no use of accessory muscles   All Lung Fields Breath Sounds wheezes, expiratory   PRE-TX-O2   O2 Device (Oxygen Therapy) nasal cannula   Flow (L/min) 2   SpO2 97 %   Pulse Oximetry Type Intermittent   $ Pulse Oximetry - Multiple Charge Pulse Oximetry - Multiple   Pulse 73   Resp 18   Aerosol Therapy   $ Aerosol Therapy Charges Aerosol Treatment   Daily Review of Necessity (SVN) completed   Respiratory Treatment Status (SVN) given   Treatment Route (SVN) mask;oxygen   Patient Position (SVN) HOB elevated   Post Treatment Assessment (SVN) breath sounds improved   Signs of Intolerance (SVN) none   Preset CPAP/BiPAP Settings   Mode Of Delivery Standby;BiPAP   Equipment Type V60   Respiratory Evaluation   $ Care Plan Tech Time 15 min

## 2020-05-15 NOTE — PT/OT/SLP EVAL
Physical Therapy Evaluation    Patient Name:  Jillian Borrego   MRN:  [id number]    Recommendations:     Discharge Recommendations:  home health PT, home health OT   Discharge Equipment Recommendations: none   Barriers to discharge: None    Assessment:     Jillian Borrego is a 89 y.o. female admitted with a medical diagnosis of Acute hypoxemic respiratory failure.  She presents with the following impairments/functional limitations:  weakness, impaired endurance, gait instability, impaired self care skills, impaired functional mobilty, impaired balance, impaired cardiopulmonary response to activity . Pt had 02 desaturation with short distance gait in the room with RW, with mild SOB, fatigue.  Will require home 02 upon D/C.    Rehab Prognosis: Fair; patient would benefit from acute skilled PT services to address these deficits and reach maximum level of function.    Recent Surgery: * No surgery found *      Plan:     During this hospitalization, patient to be seen daily to address the identified rehab impairments via gait training, therapeutic activities, therapeutic exercises and progress toward the following goals:    · Plan of Care Expires:       Subjective     Chief Complaint: decreased endurance  Patient/Family Comments/goals:home with sister  Pain/Comfort:  ·      Patients cultural, spiritual, Yazidism conflicts given the current situation:      Living Environment:  Pt lives at home alone in a one story house  with 1 step entry.   Prior to admission, patients level of function was Modified independent . She had  paid aide service for  Assisting  pt with bathing, household chores and running errands . Equipment used at home: walker, rolling, rollator, shower chair. Pt spent much of the day sitting in a lift chair.  DME owned (not currently used): none.  Upon discharge, patient will have assistance from her sister.    Objective:     Communicated with nurse and Dr. Dumont prior to session.  Patient found supine with  telemetry, peripheral IV  upon PT entry to room.    General Precautions: Standard, aspiration, fall   Orthopedic Precautions:    Braces:       Exams:  · Cognitive Exam:  Patient is oriented to Person, Place, Time and Situation  · RLE ROM: WFL  · RLE Strength: WFL  · LLE ROM: WFL  · LLE Strength: WFL    Functional Mobility:  · Bed Mobility:     · Supine to Sit: contact guard assistance  · Transfers:     · Sit to Stand:  contact guard assistance with hand-held assist  · Gait: 26 ft Rw and  CGA  · Balance: good sitting balance, fair standing balance      Therapeutic Activities and Exercises:  t/f and gait training.  Pt 's 02 on RA at rest was 90%,  After gait on RW 02 sat dropped to 84%.  On 2 L  during gait  02 sat 93%     AM-PAC 6 CLICK MOBILITY  Total Score:17     Patient left HOB elevated with call button in reach and bed alarm on.    GOALS:   Multidisciplinary Problems     Physical Therapy Goals        Problem: Physical Therapy Goal    Goal Priority Disciplines Outcome Goal Variances Interventions   Physical Therapy Goal     PT, PT/OT      Description:  Goals to be met by: D/C    Patient will increase functional independence with mobility by performin. Supine to sit with Stand-by Assistance  2. Sit to stand transfer with Stand-by Assistance  3. Gait  x 50 feet with Stand-by Assistance using Rolling Walker.                       History:     Past Medical History:   Diagnosis Date    Afib     Hx of stroke without residual deficits     Hypothyroid     Rheumatoid arthritis        Past Surgical History:   Procedure Laterality Date    RIGHT OOPHORECTOMY      ROTATOR CUFF REPAIR Right        Time Tracking:     PT Received On: 05/15/20  PT Start Time: 1410     PT Stop Time: 1432  PT Total Time (min): 22 min     Billable Minutes: Evaluation 14 minutes  and Gait Training 8 minutes      Lyndsay Novak, PT  05/15/2020

## 2020-05-15 NOTE — PROGRESS NOTES
[facility]  Cardiology  Progress Note    Patient Name: Jillian Borrego  MRN: [id number]  Admission Date: 5/11/2020  Hospital Length of Stay: 4 days  Code Status: DNR   Attending Physician: Brenton Dumont MD   Primary Care Physician: Primary Doctor No  Expected Discharge Date:   Principal Problem:Acute hypoxemic respiratory failure    Subjective:     Hospital Course:   No notes on file    Interval History:  She is feeling better today sitting at bedside.  Continues to have mild shortness of breath.  During the night she showed on a piece of meat and the nurses came in running.  Review of the monitor strip she is in atrial fibrillation with rates down to the 30s.  Will decrease the doses of metoprolol.    Review of Systems   Constitution: Negative. Negative for fever and weight gain.   Cardiovascular: Negative.  Negative for chest pain and leg swelling.   Respiratory: Positive for shortness of breath. Negative for cough.    Skin: Negative.  Negative for flushing and rash.   Musculoskeletal: Negative.  Negative for back pain and muscle cramps.   Gastrointestinal: Negative.  Negative for abdominal pain and constipation.   Neurological: Negative.  Negative for dizziness and weakness.   Psychiatric/Behavioral: Negative.  Negative for altered mental status and hallucinations.     Objective:     Vital Signs (Most Recent):  Temp: 97.9 °F (36.6 °C) (05/15/20 0734)  Pulse: 61 (05/15/20 0734)  Resp: 20 (05/15/20 0734)  BP: (!) 142/62 (05/15/20 0734)  SpO2: 96 % (05/15/20 0734) Vital Signs (24h Range):  Temp:  [97.6 °F (36.4 °C)-98 °F (36.7 °C)] 97.9 °F (36.6 °C)  Pulse:  [55-73] 61  Resp:  [16-20] 20  SpO2:  [96 %-98 %] 96 %  BP: (119-163)/(60-80) 142/62     Weight: 66.1 kg (145 lb 11.6 oz)  Body mass index is 25.01 kg/m².     SpO2: 96 %  O2 Device (Oxygen Therapy): High Flow nasal Cannula      Intake/Output Summary (Last 24 hours) at 5/15/2020 0859  Last data filed at 5/14/2020 1600  Gross per 24 hour   Intake 290  ml   Output --   Net 290 ml       Lines/Drains/Airways     Drain            Female External Urinary Catheter 05/11/20 1800 3 days          Peripheral Intravenous Line                 Peripheral IV - Single Lumen 05/11/20 1745 20 G Left Antecubital 3 days                Physical Exam   Constitutional: She is oriented to person, place, and time. She appears well-developed and well-nourished.   HENT:   Head: Normocephalic and atraumatic.   Eyes: Conjunctivae and EOM are normal.   Cardiovascular: Normal rate. An irregular rhythm present.   Murmur (Grade 2/6 systolic murmur at the base) heard.  Pulmonary/Chest: Effort normal. She has rales.   Abdominal: Soft. Bowel sounds are normal.   Musculoskeletal: She exhibits no edema.   Neurological: She is alert and oriented to person, place, and time.   Skin: Skin is warm and dry.   Psychiatric: She has a normal mood and affect. Her behavior is normal. Judgment and thought content normal.   Nursing note and vitals reviewed.      Significant Labs:   BMP:   Recent Labs   Lab 05/14/20 0422 05/15/20  0431   * 134*  134*  134*    139  139  139   K 4.6 4.3  4.3  4.3    102  102  102   CO2 27 29  29  29   BUN 69* 79*  79*  79*   CREATININE 1.6* 1.4  1.4  1.4   CALCIUM 9.5 9.4  9.4  9.4   MG 2.4 2.4   , CMP   Recent Labs   Lab 05/14/20  0422 05/15/20  0431    139  139  139   K 4.6 4.3  4.3  4.3    102  102  102   CO2 27 29  29  29   * 134*  134*  134*   BUN 69* 79*  79*  79*   CREATININE 1.6* 1.4  1.4  1.4   CALCIUM 9.5 9.4  9.4  9.4   ANIONGAP 7* 8  8  8   ESTGFRAFRICA 31.3* 36.8*  36.8*  36.8*   EGFRNONAA 27.2* 32.0*  32.0*  32.0*   , CBC   Recent Labs   Lab 05/14/20 0422 05/15/20  0431   WBC 9.89 8.39  8.39  8.39   HGB 7.9* 9.2*  9.2*  9.2*   HCT 24.8* 28.8*  28.8*  28.8*   * 111*  111*  111*    and Troponin No results for input(s): TROPONINI in the last 48 hours.  BNP  Recent Labs   Lab  05/15/20  0431   BNP 1,415*     Significant Imaging: X-Ray: CXR: X-Ray Chest PA and Lateral (CXR): No results found for this visit on 05/11/20.    Assessment and Plan:         Thrombocytopenia  It has improved.    CKD (chronic kidney disease)  Stable  Creatinine stable the BUN is going up suggesting of GI bleeding.  Will get stool for occult blood.    Hypertension  Borderline control.  It has been elevated due to the steroids.    Permanent atrial fibrillation  Heart rate decreasing down to the 30s at night.  Will decrease the doses of metoprolol to 12.5 mg daily.    Supratherapeutic INR  Reversed with subcutaneous vitamin K. as discussed the patient will benefit from Eliquis 2.5 b.i.d..  Probably should be started in few days once the H and H has been stable    Symptomatic anemia  Better after 1 unit of red blood cell.  The patient has an increased BUN that could be secondary to the steroids versus GI bleed.  Will start her on Protonix.    Acute on chronic heart failure  With normal ejection fraction of 65%.  She has aortic stenosis and moderate mitral regurgitation.    COPD (chronic obstructive pulmonary disease)  Stable improved.  She is on high doses of steroids.  Will discontinue the IV steroids and change to oral        VTE Risk Mitigation (From admission, onward)         Ordered     IP VTE HIGH RISK PATIENT  Once      05/11/20 1503     Place sequential compression device  Until discontinued      05/11/20 1503                Sohan Rebolledo MD  Cardiology  [facility]

## 2020-05-15 NOTE — ASSESSMENT & PLAN NOTE
Better after 1 unit of red blood cell.  The patient has an increased BUN that could be secondary to the steroids versus GI bleed.  Will start her on Protonix.

## 2020-05-15 NOTE — PLAN OF CARE
05/15/20 1810   Discharge Reassessment   Assessment Type Discharge Planning Reassessment   Anticipated Discharge Disposition Home-Health   DME Needed Upon Discharge  oxygen   Patient choice form signed by patient/caregiver List with quality metrics by geographic area provided   Post-Acute Status   Post-Acute Authorization Home Health   HME Status Pending Delivery Home   Home Health Status Referrals Sent   Discharge Delays None known at this time     Consult for home oxygen and home health care. Call placed to sister, no answer, call placed to daughter and choiced for [facility] for home oxygen. [facility] sent and spoke with Jaysahree stating approved for home oxygen and eJsus villanueva tomorrow and can complete set up. Call to sister Odalis, choiced for [facility], had their services in the past. Referral sent via Epic with message anticipate discharge tomorrow and CM/SW to follow up tomorrow. Update given to sister and staff.

## 2020-05-15 NOTE — SUBJECTIVE & OBJECTIVE
Interval History: improving    Review of Systems   Constitutional: Negative.    HENT: Negative.    Eyes: Negative.    Respiratory: Negative.    Cardiovascular: Negative.    Gastrointestinal: Negative.    Endocrine: Negative.    Genitourinary: Negative.    Musculoskeletal: Negative.    Skin: Negative.    Allergic/Immunologic: Negative.    Neurological: Negative.    Hematological: Negative.    All other systems reviewed and are negative.    Objective:     Vital Signs (Most Recent):  Temp: 97.6 °F (36.4 °C) (05/15/20 1112)  Pulse: 67 (05/15/20 1112)  Resp: 20 (05/15/20 1112)  BP: 138/62 (05/15/20 1112)  SpO2: 99 % (05/15/20 1112) Vital Signs (24h Range):  Temp:  [97.6 °F (36.4 °C)-98 °F (36.7 °C)] 97.6 °F (36.4 °C)  Pulse:  [55-73] 67  Resp:  [16-20] 20  SpO2:  [96 %-99 %] 99 %  BP: (119-163)/(60-69) 138/62     Weight: 66.1 kg (145 lb 11.6 oz)  Body mass index is 25.01 kg/m².    Intake/Output Summary (Last 24 hours) at 5/15/2020 1319  Last data filed at 5/15/2020 0845  Gross per 24 hour   Intake 530 ml   Output --   Net 530 ml      Physical Exam   Constitutional: She is oriented to person, place, and time. She appears well-developed and well-nourished.   HENT:   Head: Normocephalic and atraumatic.   Eyes: Pupils are equal, round, and reactive to light. Conjunctivae and EOM are normal.   Neck: Normal range of motion. Neck supple.   Cardiovascular: Normal rate, regular rhythm, normal heart sounds and intact distal pulses.   Pulmonary/Chest: Effort normal and breath sounds normal.   Decreased entry without adventitious sounds   Abdominal: Soft. Bowel sounds are normal.   Musculoskeletal: Normal range of motion.   Neurological: She is alert and oriented to person, place, and time.   Skin: Skin is warm and dry. Capillary refill takes less than 2 seconds.   Psychiatric: She has a normal mood and affect. Her behavior is normal. Judgment and thought content normal.   Nursing note and vitals reviewed.      Significant Labs:    BMP:   Recent Labs   Lab 05/15/20  0431   *  134*  134*     139  139   K 4.3  4.3  4.3     102  102   CO2 29  29  29   BUN 79*  79*  79*   CREATININE 1.4  1.4  1.4   CALCIUM 9.4  9.4  9.4   MG 2.4     CBC:   Recent Labs   Lab 05/14/20  0422 05/15/20  0431   WBC 9.89 8.39  8.39  8.39   HGB 7.9* 9.2*  9.2*  9.2*   HCT 24.8* 28.8*  28.8*  28.8*   * 111*  111*  111*       Significant Imaging: I have reviewed and interpreted all pertinent imaging results/findings within the past 24 hours.

## 2020-05-15 NOTE — ASSESSMENT & PLAN NOTE
Heart rate decreasing down to the 30s at night.  Will decrease the doses of metoprolol to 12.5 mg daily.

## 2020-05-16 VITALS
OXYGEN SATURATION: 97 % | TEMPERATURE: 98 F | WEIGHT: 145.75 LBS | DIASTOLIC BLOOD PRESSURE: 67 MMHG | BODY MASS INDEX: 24.88 KG/M2 | HEIGHT: 64 IN | SYSTOLIC BLOOD PRESSURE: 144 MMHG | HEART RATE: 60 BPM | RESPIRATION RATE: 18 BRPM

## 2020-05-16 PROBLEM — N18.9 CKD (CHRONIC KIDNEY DISEASE): Chronic | Status: RESOLVED | Noted: 2020-05-11 | Resolved: 2020-05-16

## 2020-05-16 PROBLEM — R54 ADVANCED AGE: Chronic | Status: RESOLVED | Noted: 2020-05-11 | Resolved: 2020-05-16

## 2020-05-16 PROBLEM — D64.9 SYMPTOMATIC ANEMIA: Status: RESOLVED | Noted: 2020-05-11 | Resolved: 2020-05-16

## 2020-05-16 PROBLEM — I50.9 ACUTE ON CHRONIC HEART FAILURE: Status: RESOLVED | Noted: 2020-05-11 | Resolved: 2020-05-16

## 2020-05-16 PROBLEM — I50.9 CONGESTIVE HEART FAILURE: Status: RESOLVED | Noted: 2020-05-12 | Resolved: 2020-05-16

## 2020-05-16 PROBLEM — R79.1 SUPRATHERAPEUTIC INR: Status: RESOLVED | Noted: 2020-05-11 | Resolved: 2020-05-16

## 2020-05-16 PROBLEM — I48.21 PERMANENT ATRIAL FIBRILLATION: Chronic | Status: RESOLVED | Noted: 2020-05-11 | Resolved: 2020-05-16

## 2020-05-16 PROBLEM — D69.6 THROMBOCYTOPENIA: Status: RESOLVED | Noted: 2020-05-11 | Resolved: 2020-05-16

## 2020-05-16 PROBLEM — J44.1 COPD EXACERBATION: Status: RESOLVED | Noted: 2020-05-11 | Resolved: 2020-05-16

## 2020-05-16 LAB
ANION GAP SERPL CALC-SCNC: 6 MMOL/L (ref 8–16)
ANION GAP SERPL CALC-SCNC: 6 MMOL/L (ref 8–16)
BACTERIA BLD CULT: NORMAL
BACTERIA BLD CULT: NORMAL
BASOPHILS # BLD AUTO: 0.01 K/UL (ref 0–0.2)
BASOPHILS NFR BLD: 0.1 % (ref 0–1.9)
BUN SERPL-MCNC: 75 MG/DL (ref 10–30)
BUN SERPL-MCNC: 75 MG/DL (ref 10–30)
CALCIUM SERPL-MCNC: 9.5 MG/DL (ref 8.7–10.5)
CALCIUM SERPL-MCNC: 9.5 MG/DL (ref 8.7–10.5)
CHLORIDE SERPL-SCNC: 104 MMOL/L (ref 95–110)
CHLORIDE SERPL-SCNC: 104 MMOL/L (ref 95–110)
CO2 SERPL-SCNC: 30 MMOL/L (ref 23–29)
CO2 SERPL-SCNC: 30 MMOL/L (ref 23–29)
CREAT SERPL-MCNC: 1.2 MG/DL (ref 0.5–1.4)
CREAT SERPL-MCNC: 1.2 MG/DL (ref 0.5–1.4)
DIFFERENTIAL METHOD: ABNORMAL
EOSINOPHIL # BLD AUTO: 0 K/UL (ref 0–0.5)
EOSINOPHIL NFR BLD: 0 % (ref 0–8)
ERYTHROCYTE [DISTWIDTH] IN BLOOD BY AUTOMATED COUNT: 14.5 % (ref 11.5–14.5)
ERYTHROCYTE [DISTWIDTH] IN BLOOD BY AUTOMATED COUNT: 14.5 % (ref 11.5–14.5)
EST. GFR  (AFRICAN AMERICAN): 44.4 ML/MIN/1.73 M^2
EST. GFR  (AFRICAN AMERICAN): 44.4 ML/MIN/1.73 M^2
EST. GFR  (NON AFRICAN AMERICAN): 38.5 ML/MIN/1.73 M^2
EST. GFR  (NON AFRICAN AMERICAN): 38.5 ML/MIN/1.73 M^2
GLUCOSE SERPL-MCNC: 120 MG/DL (ref 70–110)
GLUCOSE SERPL-MCNC: 120 MG/DL (ref 70–110)
HCT VFR BLD AUTO: 29.1 % (ref 37–48.5)
HCT VFR BLD AUTO: 29.1 % (ref 37–48.5)
HGB BLD-MCNC: 9.3 G/DL (ref 12–16)
HGB BLD-MCNC: 9.3 G/DL (ref 12–16)
IMM GRANULOCYTES # BLD AUTO: 0.17 K/UL (ref 0–0.04)
IMM GRANULOCYTES NFR BLD AUTO: 2 % (ref 0–0.5)
INR PPP: 1.4
LYMPHOCYTES # BLD AUTO: 0.4 K/UL (ref 1–4.8)
LYMPHOCYTES NFR BLD: 4.2 % (ref 18–48)
MAGNESIUM SERPL-MCNC: 2.3 MG/DL (ref 1.6–2.6)
MCH RBC QN AUTO: 31.8 PG (ref 27–31)
MCH RBC QN AUTO: 31.8 PG (ref 27–31)
MCHC RBC AUTO-ENTMCNC: 32 G/DL (ref 32–36)
MCHC RBC AUTO-ENTMCNC: 32 G/DL (ref 32–36)
MCV RBC AUTO: 100 FL (ref 82–98)
MCV RBC AUTO: 100 FL (ref 82–98)
MONOCYTES # BLD AUTO: 0.5 K/UL (ref 0.3–1)
MONOCYTES NFR BLD: 5.7 % (ref 4–15)
NEUTROPHILS # BLD AUTO: 7.4 K/UL (ref 1.8–7.7)
NEUTROPHILS NFR BLD: 88 % (ref 38–73)
NRBC BLD-RTO: 0 /100 WBC
PHOSPHATE SERPL-MCNC: 2.9 MG/DL (ref 2.7–4.5)
PLATELET # BLD AUTO: 113 K/UL (ref 150–350)
PLATELET # BLD AUTO: 113 K/UL (ref 150–350)
PMV BLD AUTO: 11.6 FL (ref 9.2–12.9)
PMV BLD AUTO: 11.6 FL (ref 9.2–12.9)
POTASSIUM SERPL-SCNC: 4.6 MMOL/L (ref 3.5–5.1)
POTASSIUM SERPL-SCNC: 4.6 MMOL/L (ref 3.5–5.1)
PROTHROMBIN TIME: 16.3 SEC (ref 10.6–14.8)
RBC # BLD AUTO: 2.92 M/UL (ref 4–5.4)
RBC # BLD AUTO: 2.92 M/UL (ref 4–5.4)
SODIUM SERPL-SCNC: 140 MMOL/L (ref 136–145)
SODIUM SERPL-SCNC: 140 MMOL/L (ref 136–145)
WBC # BLD AUTO: 8.42 K/UL (ref 3.9–12.7)
WBC # BLD AUTO: 8.42 K/UL (ref 3.9–12.7)

## 2020-05-16 PROCEDURE — 80048 BASIC METABOLIC PNL TOTAL CA: CPT

## 2020-05-16 PROCEDURE — 25000003 PHARM REV CODE 250: Performed by: INTERNAL MEDICINE

## 2020-05-16 PROCEDURE — 63600175 PHARM REV CODE 636 W HCPCS: Performed by: INTERNAL MEDICINE

## 2020-05-16 PROCEDURE — 27000221 HC OXYGEN, UP TO 24 HOURS

## 2020-05-16 PROCEDURE — 94761 N-INVAS EAR/PLS OXIMETRY MLT: CPT

## 2020-05-16 PROCEDURE — 94660 CPAP INITIATION&MGMT: CPT

## 2020-05-16 PROCEDURE — 97116 GAIT TRAINING THERAPY: CPT

## 2020-05-16 PROCEDURE — 83735 ASSAY OF MAGNESIUM: CPT

## 2020-05-16 PROCEDURE — 97530 THERAPEUTIC ACTIVITIES: CPT

## 2020-05-16 PROCEDURE — 97535 SELF CARE MNGMENT TRAINING: CPT

## 2020-05-16 PROCEDURE — 36415 COLL VENOUS BLD VENIPUNCTURE: CPT

## 2020-05-16 PROCEDURE — 99900035 HC TECH TIME PER 15 MIN (STAT)

## 2020-05-16 PROCEDURE — 94640 AIRWAY INHALATION TREATMENT: CPT

## 2020-05-16 PROCEDURE — 84100 ASSAY OF PHOSPHORUS: CPT

## 2020-05-16 PROCEDURE — 85025 COMPLETE CBC W/AUTO DIFF WBC: CPT

## 2020-05-16 PROCEDURE — 85610 PROTHROMBIN TIME: CPT

## 2020-05-16 RX ORDER — PREDNISONE 20 MG/1
20 TABLET ORAL DAILY
Qty: 2 TABLET | Refills: 0 | Status: SHIPPED | OUTPATIENT
Start: 2020-05-17 | End: 2020-05-19

## 2020-05-16 RX ORDER — METOPROLOL SUCCINATE 25 MG/1
12.5 TABLET, EXTENDED RELEASE ORAL DAILY
Qty: 15 TABLET | Refills: 11 | Status: SHIPPED | OUTPATIENT
Start: 2020-05-17 | End: 2021-05-17

## 2020-05-16 RX ORDER — POTASSIUM CHLORIDE 20 MEQ/1
20 TABLET, EXTENDED RELEASE ORAL
Qty: 30 TABLET | Refills: 5 | Status: SHIPPED | OUTPATIENT
Start: 2020-05-16

## 2020-05-16 RX ORDER — PREDNISONE 10 MG/1
10 TABLET ORAL DAILY
Qty: 4 TABLET | Refills: 0 | Status: SHIPPED | OUTPATIENT
Start: 2020-05-19 | End: 2020-05-23

## 2020-05-16 RX ORDER — FLUTICASONE FUROATE AND VILANTEROL 100; 25 UG/1; UG/1
1 POWDER RESPIRATORY (INHALATION) DAILY
Qty: 1 EACH | Refills: 5 | Status: SHIPPED | OUTPATIENT
Start: 2020-05-17

## 2020-05-16 RX ORDER — PANTOPRAZOLE SODIUM 40 MG/1
40 TABLET, DELAYED RELEASE ORAL
Qty: 30 TABLET | Refills: 11 | Status: SHIPPED | OUTPATIENT
Start: 2020-05-17 | End: 2021-05-17

## 2020-05-16 RX ADMIN — ALLOPURINOL 100 MG: 100 TABLET ORAL at 09:05

## 2020-05-16 RX ADMIN — PANTOPRAZOLE SODIUM 40 MG: 40 TABLET, DELAYED RELEASE ORAL at 09:05

## 2020-05-16 RX ADMIN — AMLODIPINE BESYLATE 5 MG: 5 TABLET ORAL at 09:05

## 2020-05-16 RX ADMIN — FLUTICASONE FUROATE AND VILANTEROL TRIFENATATE 1 PUFF: 100; 25 POWDER RESPIRATORY (INHALATION) at 08:05

## 2020-05-16 RX ADMIN — FUROSEMIDE 40 MG: 10 INJECTION, SOLUTION INTRAMUSCULAR; INTRAVENOUS at 09:05

## 2020-05-16 RX ADMIN — LEVOTHYROXINE SODIUM 88 MCG: 88 TABLET ORAL at 09:05

## 2020-05-16 RX ADMIN — METOPROLOL SUCCINATE 12.5 MG: 25 TABLET, EXTENDED RELEASE ORAL at 09:05

## 2020-05-16 RX ADMIN — SPIRONOLACTONE 25 MG: 25 TABLET ORAL at 09:05

## 2020-05-16 RX ADMIN — PREDNISONE 20 MG: 20 TABLET ORAL at 09:05

## 2020-05-16 NOTE — PLAN OF CARE
05/15/20 2100   Patient Assessment/Suction   Level of Consciousness (AVPU) alert   Respiratory Effort Normal;Unlabored   Expansion/Accessory Muscles/Retractions no use of accessory muscles   PRE-TX-O2   O2 Device (Oxygen Therapy) High Flow nasal Cannula   Flow (L/min) 2   SpO2 97 %   Pulse Oximetry Type Intermittent   $ Pulse Oximetry - Multiple Charge Pulse Oximetry - Multiple   Pulse 62   Resp 18   Aerosol Therapy   $ Aerosol Therapy Charges PRN treatment not required   Preset CPAP/BiPAP Settings   Mode Of Delivery Standby;BiPAP   Respiratory Evaluation   $ Care Plan Tech Time 15 min   Evaluation For New Orders

## 2020-05-16 NOTE — NURSING
Discharge instructions given to patient and sister. Patient and sister verbalized understanding of follow up appointments and medication changes. Awaiting on home oxygen to be delivered. Patient IV removed without difficulty, catheter tip intact. Tele removed. All questions answered. Patient will discharge home with home health via private vehicle.

## 2020-05-16 NOTE — PT/OT/SLP PROGRESS
Physical Therapy Treatment    Patient Name:  Jillian Borrego   MRN:  [id number]    Recommendations:     Discharge Recommendations:  home health PT, home health OT   Discharge Equipment Recommendations: none   Barriers to discharge: None    Assessment:     Jillian Borrego is a 89 y.o. female admitted with a medical diagnosis of Acute hypoxemic respiratory failure.  She presents with the following impairments/functional limitations:  weakness, impaired endurance, gait instability, impaired self care skills, impaired functional mobilty, impaired balance, impaired cardiopulmonary response to activity    Rehab Prognosis: Good; patient would benefit from acute skilled PT services to address these deficits and reach maximum level of function.    Recent Surgery: * No surgery found *      Plan:     During this hospitalization, patient to be seen daily to address the identified rehab impairments via gait training, therapeutic activities, therapeutic exercises and progress toward the following goals:    · Plan of Care Expires:       Subjective     Chief Complaint: weakness  Patient/Family Comments/goals: home with sister  Pain/Comfort:  ·        Objective:     Communicated with nurse prior to session.  Patient found supine with telemetry, peripheral IV upon PT entry to room.     General Precautions: Standard, aspiration, fall   Orthopedic Precautions:    Braces:       Functional Mobility:  · Bed Mobility:     · Supine to Sit: stand by assistance  · Sit to Supine: supervision  · Transfers:     · Sit to Stand:  contact guard assistance with hand-held assist  · Gait: 40 ft x2 RW and  SBA  · Balance: good sitting balance, fair standing balance      AM-PAC 6 CLICK MOBILITY  Turning over in bed (including adjusting bedclothes, sheets and blankets)?: 4  Sitting down on and standing up from a chair with arms (e.g., wheelchair, bedside commode, etc.): 4  Moving from lying on back to sitting on the side of the bed?: 4  Moving to and from a bed  to a chair (including a wheelchair)?: 3  Need to walk in hospital room?: 3  Climbing 3-5 steps with a railing?: 2  Basic Mobility Total Score: 20       Therapeutic Activities and Exercises:   t/f training, standing balance training as pt stood for changing of bedding and her gown , gait in the tate RW and SBA 2 L 02 in place 02 sat 98%.    Patient left supine with call button in reach and bed alarm on..    GOALS:   Multidisciplinary Problems     Physical Therapy Goals     Not on file          Multidisciplinary Problems (Resolved)        Problem: Physical Therapy Goal    Goal Priority Disciplines Outcome Goal Variances Interventions   Physical Therapy Goal   (Resolved)     PT, PT/OT Met     Description:  Goals to be met by: D/C    Patient will increase functional independence with mobility by performin. Supine to sit with Stand-by Assistance  2. Sit to stand transfer with Stand-by Assistance  3. Gait  x 50 feet with Stand-by Assistance using Rolling Walker.                       Time Tracking:     PT Received On: 20  PT Start Time: 1145     PT Stop Time: 1210  PT Total Time (min): 25 min     Billable Minutes: Gait Training 13 minutes and Therapeutic Activity 12 minutes    Treatment Type: (PT eval)  PT/PTA: PT     PTA Visit Number: 0     Lyndsay Novak, PT  2020

## 2020-05-16 NOTE — PLAN OF CARE
Plan of care discussed with patient. Patient is free of fall/trauma/injury. Denies CP, SOB, or pain/discomfort. All questions addressed. Will continue to monitor.

## 2020-05-16 NOTE — PLAN OF CARE
05/16/20 0825   Patient Assessment/Suction   Level of Consciousness (AVPU) alert   Expansion/Accessory Muscles/Retractions no use of accessory muscles   All Lung Fields Breath Sounds clear   Rhythm/Pattern, Respiratory unlabored;pattern regular;depth regular   Cough Frequency no cough   PRE-TX-O2   O2 Device (Oxygen Therapy) High Flow nasal Cannula   $ Is the patient on Low Flow Oxygen? Yes   Flow (L/min) 2   SpO2 100 %   Pulse Oximetry Type Intermittent   $ Pulse Oximetry - Multiple Charge Pulse Oximetry - Multiple   Pulse 60   Resp 14   Inhaler   $ Inhaler Charges MDI (Metered Dose Inahler) Treatment   Daily Review of Necessity (Inhaler) completed   Respiratory Treatment Status (Inhaler) given   Treatment Route (Inhaler) mouthpiece   Patient Position (Inhaler) Jones's   Post Treatment Assessment (Inhaler) breath sounds improved   Signs of Intolerance (Inhaler) none   Breath Sounds Post-Respiratory Treatment   Throughout All Fields Post-Treatment All Fields   Throughout All Fields Post-Treatment aeration increased   Post-treatment Heart Rate (beats/min) 62   Post-treatment Resp Rate (breaths/min) 16   Respiratory Evaluation   $ Care Plan Tech Time 15 min

## 2020-05-16 NOTE — PLAN OF CARE
Problem: Occupational Therapy Goal  Goal: Occupational Therapy Goal  Description  Goals to be met by: discharge    Patient will increase functional independence with ADLs by performing:    UE Dressing with Supervision.  LE Dressing with Stand-by Assistance.  Grooming while seated with Modified [place].  Toileting from toilet with Supervision for hygiene and clothing management.   Bathing from  sitting at sink with Stand-by Assistance.  Toilet transfer to toilet with Stand-by Assistance.     Outcome: Ongoing, Progressing

## 2020-05-16 NOTE — PT/OT/SLP DISCHARGE
Occupational Therapy Discharge Summary    Jillian Borrego  MRN: [id number]   Principal Problem: Acute hypoxemic respiratory failure      Patient Discharged from acute Occupational Therapy on 5/16/2020.  Please refer to prior OT note dated 5/16/2020 for functional status.    Assessment:      Patient appropriate for care in another setting.    Objective:     GOALS:   Multidisciplinary Problems     Occupational Therapy Goals     Not on file          Multidisciplinary Problems (Resolved)        Problem: Occupational Therapy Goal    Goal Priority Disciplines Outcome Interventions   Occupational Therapy Goal   (Resolved)     OT, PT/OT Met    Description:  Goals to be met by: discharge    Patient will increase functional independence with ADLs by performing:    UE Dressing with Supervision.  LE Dressing with Stand-by Assistance.  Grooming while seated with Modified [place].  Toileting from toilet with Supervision for hygiene and clothing management.   Bathing from  sitting at sink with Stand-by Assistance.  Toilet transfer to toilet with Stand-by Assistance.                      Reasons for Discontinuation of Therapy Services  Transfer to alternate level of care.      Plan:     Patient Discharged to: Home with Home Health Service    Sheron Torres OT  5/16/2020

## 2020-05-16 NOTE — DISCHARGE SUMMARY
"[facility] Medicine  Discharge Summary      Patient Name: Jillian Borrego  MRN: [id number]  Admission Date: 5/11/2020  Hospital Length of Stay: 5 days  Discharge Date and Time:  05/16/2020 12:36 PM  Attending Physician: Brenton Dumont MD   Discharging Provider: Brenton Dumont MD  Primary Care Provider: Primary Doctor No      HPI:   HPI per patient and per ER records  89-year-old female with COPD, chronic heart failure, hyperlipidemia, hypothyroidism, chronic atrial fibrillation on warfarin, CKD, advanced age, DNR comes in for shortness of breath and fall.  Per ER records, patient had at accidental trip and fell and struck her head.  EMS was called and patient appeared short of breath however patient refused to come to the ED.  Patient continued to have shortness of breath during the day and EMS was subsequently called again and brought patient to the ED for further evaluation.  During my interview, patient is short of breath and tachypneic saturating 84% on 5 L nasal cannula.  Able to speak and wanted to worsen sepsis.  Reports living home alone.  Her son and daughter come see her regularly.  Patient was then placed on BiPAP.  Denies fever, chills, chest pain.  Denies hemoptysis, hematuria, melena, hematochezia, gross bleeding.    In the ED, RR 30s, 84% on 5 L, H&H 8.8/28.4 (Baseline 12-13 in 2019), INR 5.3, Cr 1.5 (BL 1.3), , troponin 0.361,  UA with leukocytes and many bacteria, head CT unremarkable, chest x-ray concern for acute heart failure.    * No surgery found *      Hospital Course:   05/12 Discussed patient with Intensivist: stable for transfer to Cardiology unit. Patient feels "Better". Is able to speak in short sentences. Labs reviewed INR = 5.0; Stage 3 CKI    05/13 Labs reviewed INR = 7.7. Received vitamin K. Feels "Good". More alert today. Breathing more easily    05/14 Labs reviewed: No INR this AM, have ordered "Stat"; H/H has fallen again. 1 unit pRBC ordered, consent " "obtained. Patient denies CP, but continues to have dyspnea.     05/15  Nursing states "Dark" stool this AM. Order for FOB is in. Lab reviewed: H/H good after transfusion; INR 1.7 Patient states she feels "Good". Patient's sister is in town to be with her when discharged (reportedly the sister is a "Good" 70 year old).    05/16 Patient's blood count has increased. No blood pr. Patient feels "Good" and would like to be discharged home with her sister.   VSS  Lungs: decreased entry without adventitious sounds  Heart: S1S2 irreg irreg  Abdo: soft     Consults:   Consults (From admission, onward)        Status Ordering Provider     Inpatient consult to Cardiology  Once     Provider:  Darius Be MD    Completed VON BAIRES     Inpatient consult to Gastroenterology  Once     Provider:  Ciro Villalta III, MD    Completed VON BAIRES     Inpatient consult to Hospitalist  Once     Provider:  Von Baires MD    Acknowledged ALLAN ANGELA     Inpatient consult to Social Work/Case Management  Once     Provider:  (Not yet assigned)    Acknowledged ALLAN ANGELA     Inpatient consult to Social Work/Case Management  Once     Provider:  (Not yet assigned)    Acknowledged ALLAN ANGELA          DNR (do not resuscitate)        Hypothyroidism  Continue current regimen      Hyperlipidemia    Continue current regimen    Hypertension  Continue current regimen      COPD (chronic obstructive pulmonary disease)  Continue supplemental oxygen; continue current regimen        Final Active Diagnoses:    Diagnosis Date Noted POA    COPD (chronic obstructive pulmonary disease) [J44.9] 05/11/2020 Yes     Chronic    Hypertension [I10] 05/11/2020 Yes     Chronic    Hyperlipidemia [E78.5] 05/11/2020 Yes     Chronic    Hypothyroidism [E03.9] 05/11/2020 Yes     Chronic    DNR (do not resuscitate) [Z66] 05/11/2020 Yes     Chronic      Problems Resolved During this Admission:    Diagnosis Date Noted Date " "Resolved POA    PRINCIPAL PROBLEM:  Acute hypoxemic respiratory failure [J96.01] 05/11/2020 05/12/2020 Yes    Congestive heart failure [I50.9] 05/12/2020 05/16/2020 Yes    Acute on chronic heart failure [I50.9] 05/11/2020 05/16/2020 Yes    Symptomatic anemia [D64.9] 05/11/2020 05/16/2020 Yes    Supratherapeutic INR [R79.1] 05/11/2020 05/16/2020 Yes    Permanent atrial fibrillation [I48.21] 05/11/2020 05/16/2020 Yes     Chronic    Advanced age [R54] 05/11/2020 05/16/2020 Yes     Chronic    CKD (chronic kidney disease) [N18.9] 05/11/2020 05/16/2020 Yes     Chronic    Thrombocytopenia [D69.6] 05/11/2020 05/16/2020 Yes    COPD exacerbation [J44.1] 05/11/2020 05/16/2020 Yes       Discharged Condition: fair    Disposition: Home or Self Care    Follow Up:  Follow-up Information     Sohan Rebolledo MD In 1 week.    Specialties:  Cardiovascular Disease, Cardiology  Why:  post discharge follow-up  Contact information:  [facility] [address] [address]  [phone]             Sohan Rebolledo MD .    Specialties:  Cardiovascular Disease, Cardiology  Contact information:  [address] [place] [address]  [phone]                 Patient Instructions:      OXYGEN FOR HOME USE     Order Specific Question Answer Comments   Liter Flow 2    Duration Continuous    Qualifying SpO2: 84%    Testing done at: [facility]    Route nasal cannula    Device home concentrator with portable unit    Length of need (in months): 99 mos    Height: 5' 4" (1.626 m)    Weight: 66.1 kg (145 lb 11.6 oz)    Does patient have medical equipment at home? walker, rolling    Does patient have medical equipment at home? rollator    Does patient have medical equipment at home? shower chair    Does patient have medical equipment at home? none    Alternative treatment measures have been tried or considered and deemed clinically ineffective. Yes      Ambulatory referral/consult to " Home Health   Standing Status: Future   Referral Priority: Routine Referral Type: Home Health Care   Referral Reason: Specialty Services Required   Requested Specialty: Home Health Services   Number of Visits Requested: 1     Ambulatory referral/consult to Cardiology   Standing Status: Future   Referral Priority: Routine Referral Type: Consultation   Referral Reason: Specialty Services Required   Referred to Provider: MAL RIBEIRO Requested Specialty: Cardiology   Number of Visits Requested: 1     Diet Cardiac     Activity as tolerated       Significant Diagnostic Studies: Labs:   BMP:   Recent Labs   Lab 05/15/20  0431 05/16/20  0316   *  134*  134* 120*  120*     139  139 140  140   K 4.3  4.3  4.3 4.6  4.6     102  102 104  104   CO2 29  29  29 30*  30*   BUN 79*  79*  79* 75*  75*   CREATININE 1.4  1.4  1.4 1.2  1.2   CALCIUM 9.4  9.4  9.4 9.5  9.5   MG 2.4 2.3    and CBC   Recent Labs   Lab 05/15/20  0431 05/16/20  0317   WBC 8.39  8.39  8.39 8.42  8.42   HGB 9.2*  9.2*  9.2* 9.3*  9.3*   HCT 28.8*  28.8*  28.8* 29.1*  29.1*   *  111*  111* 113*  113*       Pending Diagnostic Studies:     None         Medications:  Reconciled Home Medications:      Medication List      START taking these medications    apixaban 2.5 mg Tab  Commonly known as:  ELIQUIS  Take 1 tablet (2.5 mg total) by mouth 2 (two) times daily. Start Monday 05/18/2020     fluticasone furoate-vilanteroL 100-25 mcg/dose diskus inhaler  Commonly known as:  BREO  Inhale 1 puff into the lungs once daily. Controller  Start taking on:  May 17, 2020     pantoprazole 40 MG tablet  Commonly known as:  PROTONIX  Take 1 tablet (40 mg total) by mouth before breakfast.  Start taking on:  May 17, 2020     potassium chloride SA 20 MEQ tablet  Commonly known as:  K-DUR,KLOR-CON  Take 1 tablet (20 mEq total) by mouth as needed (Administer if serum K+ 3.7-3.9 mmol/L and serum creatinine is less  than or equal to 1.5mg/dL).     * predniSONE 20 MG tablet  Commonly known as:  DELTASONE  Take 1 tablet (20 mg total) by mouth once daily. for 2 days  Start taking on:  May 17, 2020     * predniSONE 10 MG tablet  Commonly known as:  DELTASONE  Take 1 tablet (10 mg total) by mouth once daily. Start when Prednisone 20 mg dose has ended (05/19/2020) for 4 days  Start taking on:  May 19, 2020         * This list has 2 medication(s) that are the same as other medications prescribed for you. Read the directions carefully, and ask your doctor or other care provider to review them with you.            CHANGE how you take these medications    metoprolol succinate 25 MG 24 hr tablet  Commonly known as:  TOPROL-XL  Take 0.5 tablets (12.5 mg total) by mouth once daily.  Start taking on:  May 17, 2020  What changed:  how much to take        CONTINUE taking these medications    albuterol-ipratropium 2.5 mg-0.5 mg/3 mL nebulizer solution  Commonly known as:  DUO-NEB  Take 3 mLs by nebulization every 6 (six) hours as needed for Wheezing. Rescue     allopurinoL 100 MG tablet  Commonly known as:  ZYLOPRIM  Take 100 mg by mouth once daily.     benzonatate 100 MG capsule  Commonly known as:  TESSALON  TAKE 1-2 CAPSULES BY MOUTH EVERY 8 HOURS AS NEEDED FOR COUGH     calcitRIOL 0.25 MCG Cap  Commonly known as:  ROCALTROL  Take 0.25 mcg by mouth once daily.     LASIX ORAL  Take 40 mg by mouth once daily.     LevoxyL 88 MCG tablet  Generic drug:  levothyroxine  Take 88 mcg by mouth once daily.     multivitamin per tablet  Commonly known as:  THERAGRAN  Take 1 tablet by mouth once daily.     NORVASC 5 MG tablet  Generic drug:  amLODIPine  Take 5 mg by mouth once daily.     spironolactone 25 MG tablet  Commonly known as:  ALDACTONE  Take 25 mg by mouth once daily.     TRELEGY ELLIPTA 100-62.5-25 mcg Dsdv  Generic drug:  fluticasone-umeclidin-vilanter  Inhale 1 puff into the lungs every 4 to 6 hours as needed.     ZOCOR 20 MG tablet  Generic  drug:  simvastatin  Take 20 mg by mouth every evening.        STOP taking these medications    COUMADIN ORAL     warfarin 5 MG tablet  Commonly known as:  COUMADIN            Indwelling Lines/Drains at time of discharge:   Lines/Drains/Airways     Drain            Female External Urinary Catheter 05/11/20 1800 4 days                Time spent on the discharge of patient: 32 minutes  Patient was seen and examined on the date of discharge and determined to be suitable for discharge.         Brenton Dumont MD  Department of Hospital Medicine  [facility]

## 2020-05-16 NOTE — PLAN OF CARE
05/16/20 1624   Final Note   Assessment Type Final Discharge Note   Anticipated Discharge Disposition Home-Health   [facility] is delivering oxygen to patient so patient can discharge. Also sending home health discharge orders to [facility].    Olive from [facility] called to let AIME ANSARI know that received discharge orders. AIME missed call but left message that she discharged 5/16/2020 so they can admit her to home health.

## 2020-05-16 NOTE — PT/OT/SLP PROGRESS
Occupational Therapy   Treatment    Name: Jillian Borrego  MRN: [id number]  Admitting Diagnosis:  Acute hypoxemic respiratory failure       Recommendations:     Discharge Recommendations: home health OT, home health PT  Discharge Equipment Recommendations:  none  Barriers to discharge:  None    Assessment:     Jillian Borrego is a 89 y.o. female with a medical diagnosis of Acute hypoxemic respiratory failure.  She presents with the following performance deficits affecting function: weakness, impaired endurance, impaired self care skills, impaired functional mobilty, gait instability, impaired balance, impaired cardiopulmonary response to activity. Pt participated in ADL session, tolerated well and demonstrated improvement with functional mobility.    Rehab Prognosis:  Good; patient would benefit from acute skilled OT services to address these deficits and reach maximum level of function.       Plan:     Patient to be seen 5 x/week to address the above listed problems via self-care/home management, therapeutic activities, therapeutic exercises  · Plan of Care Expires: 06/15/20  · Plan of Care Reviewed with: patient    Subjective     Pain/Comfort:  · Pain Rating 1: 0/10    Objective:     Communicated with: nurse prior to session.  Patient found HOB elevated with telemetry upon OT entry to room.    General Precautions: Standard, aspiration, fall   Orthopedic Precautions:N/A   Braces: N/A     Occupational Performance:     Bed Mobility:    · Patient completed Rolling/Turning to Left with  stand by assistance  · Patient completed Rolling/Turning to Right with stand by assistance  · Patient completed Scooting/Bridging with stand by assistance     Functional Mobility/Transfers:  · Patient completed Sit <> Stand Transfer with contact guard assistance  with  rolling walker   · Patient completed Toilet Transfer Step Transfer technique with contact guard assistance with  rolling walker  · Functional Mobility: Pt ambulated ~30 ft within  room with RW and CGA.    Activities of Daily Living:  · Grooming: contact guard assistance standing at sink for oral care  · Bathing: contact guard assistance while in standing for pericare. Pt performed seated on BSC at sink.  · Upper Body Dressing: minimum assistance to doff/don gown  · Lower Body Dressing: total assistance to don socks; Pt reports she has assist with this at home.  · Toileting: contact guard assistance for balance while performing pericare in standing    Treatment & Education:  OT role/POC  Pt educated on proper technique for functional transfers; required cueing throughout session.    Patient left HOB elevated with all lines intact, call button in reach and bed alarm onEducation:      GOALS:   Multidisciplinary Problems     Occupational Therapy Goals        Problem: Occupational Therapy Goal    Goal Priority Disciplines Outcome Interventions   Occupational Therapy Goal     OT, PT/OT Ongoing, Progressing    Description:  Goals to be met by: discharge    Patient will increase functional independence with ADLs by performing:    UE Dressing with Supervision.  LE Dressing with Stand-by Assistance.  Grooming while seated with Modified [place].  Toileting from toilet with Supervision for hygiene and clothing management.   Bathing from  sitting at sink with Stand-by Assistance.  Toilet transfer to toilet with Stand-by Assistance.                      Time Tracking:     OT Date of Treatment: 05/16/20  OT Start Time: 0945  OT Stop Time: 1028  OT Total Time (min): 43 min    Billable Minutes:Self Care/Home Management 30  Therapeutic Activity 13    Sheron Torres OT  5/16/2020

## 2020-05-25 ENCOUNTER — EXTERNAL HOME HEALTH (OUTPATIENT)
Dept: HOME HEALTH SERVICES | Facility: HOSPITAL | Age: 85
End: 2020-05-25
Payer: MEDICARE

## 2020-05-25 ENCOUNTER — DOCUMENT SCAN (OUTPATIENT)
Dept: HOME HEALTH SERVICES | Facility: HOSPITAL | Age: 85
End: 2020-05-25
Payer: MEDICARE

## 2020-06-02 ENCOUNTER — LAB VISIT (OUTPATIENT)
Dept: LAB | Facility: HOSPITAL | Age: 85
End: 2020-06-02
Attending: INTERNAL MEDICINE
Payer: MEDICARE

## 2020-06-02 DIAGNOSIS — N17.9 ACUTE KIDNEY FAILURE, UNSPECIFIED: Primary | ICD-10-CM

## 2020-06-02 LAB
ALBUMIN SERPL BCP-MCNC: 3.6 G/DL (ref 3.5–5.2)
ANION GAP SERPL CALC-SCNC: 12 MMOL/L (ref 8–16)
BILIRUB UR QL STRIP: NEGATIVE
BUN SERPL-MCNC: 42 MG/DL (ref 10–30)
CALCIUM SERPL-MCNC: 9.8 MG/DL (ref 8.7–10.5)
CHLORIDE SERPL-SCNC: 100 MMOL/L (ref 95–110)
CLARITY UR: CLEAR
CO2 SERPL-SCNC: 27 MMOL/L (ref 23–29)
COLOR UR: YELLOW
CREAT SERPL-MCNC: 1.9 MG/DL (ref 0.5–1.4)
EST. GFR  (AFRICAN AMERICAN): 25.5 ML/MIN/1.73 M^2
EST. GFR  (NON AFRICAN AMERICAN): 22.1 ML/MIN/1.73 M^2
GLUCOSE SERPL-MCNC: 119 MG/DL (ref 70–110)
GLUCOSE UR QL STRIP: NEGATIVE
HGB UR QL STRIP: NEGATIVE
KETONES UR QL STRIP: NEGATIVE
LEUKOCYTE ESTERASE UR QL STRIP: NEGATIVE
NITRITE UR QL STRIP: NEGATIVE
PH UR STRIP: 6 [PH] (ref 5–8)
PHOSPHATE SERPL-MCNC: 3.3 MG/DL (ref 2.7–4.5)
POTASSIUM SERPL-SCNC: 4.3 MMOL/L (ref 3.5–5.1)
PROT UR QL STRIP: NEGATIVE
SODIUM SERPL-SCNC: 139 MMOL/L (ref 136–145)
SP GR UR STRIP: 1.02 (ref 1–1.03)
URN SPEC COLLECT METH UR: NORMAL
UROBILINOGEN UR STRIP-ACNC: NEGATIVE EU/DL

## 2020-06-02 PROCEDURE — 80069 RENAL FUNCTION PANEL: CPT

## 2020-06-02 PROCEDURE — 36415 COLL VENOUS BLD VENIPUNCTURE: CPT

## 2020-06-02 PROCEDURE — 81003 URINALYSIS AUTO W/O SCOPE: CPT

## 2020-06-09 ENCOUNTER — DOCUMENT SCAN (OUTPATIENT)
Dept: HOME HEALTH SERVICES | Facility: HOSPITAL | Age: 85
End: 2020-06-09
Payer: MEDICARE

## 2020-06-09 ENCOUNTER — DOCUMENT SCAN (OUTPATIENT)
Dept: HOME HEALTH SERVICES | Facility: HOSPITAL | Age: 85
End: 2020-06-09

## 2020-06-23 ENCOUNTER — EXTERNAL HOME HEALTH (OUTPATIENT)
Dept: HOME HEALTH SERVICES | Facility: HOSPITAL | Age: 85
End: 2020-06-23
Payer: MEDICARE

## 2020-06-24 ENCOUNTER — LAB VISIT (OUTPATIENT)
Dept: LAB | Facility: HOSPITAL | Age: 85
End: 2020-06-24
Attending: INTERNAL MEDICINE
Payer: MEDICARE

## 2020-06-24 DIAGNOSIS — N18.9 CHRONIC KIDNEY DISEASE, UNSPECIFIED: ICD-10-CM

## 2020-06-24 DIAGNOSIS — N17.9 ACUTE KIDNEY FAILURE, UNSPECIFIED: Primary | ICD-10-CM

## 2020-06-24 DIAGNOSIS — M10.9 GOUT, UNSPECIFIED: ICD-10-CM

## 2020-06-24 DIAGNOSIS — N25.81 SECONDARY HYPERPARATHYROIDISM OF RENAL ORIGIN: ICD-10-CM

## 2020-06-24 LAB
ALBUMIN SERPL BCP-MCNC: 3.5 G/DL (ref 3.5–5.2)
ANION GAP SERPL CALC-SCNC: 6 MMOL/L (ref 8–16)
BASOPHILS # BLD AUTO: 0.04 K/UL (ref 0–0.2)
BASOPHILS NFR BLD: 0.5 % (ref 0–1.9)
BILIRUB UR QL STRIP: NEGATIVE
BUN SERPL-MCNC: 39 MG/DL (ref 10–30)
CALCIUM SERPL-MCNC: 10 MG/DL (ref 8.7–10.5)
CHLORIDE SERPL-SCNC: 105 MMOL/L (ref 95–110)
CLARITY UR: CLEAR
CO2 SERPL-SCNC: 29 MMOL/L (ref 23–29)
COLOR UR: COLORLESS
CREAT SERPL-MCNC: 1.8 MG/DL (ref 0.5–1.4)
DIFFERENTIAL METHOD: ABNORMAL
EOSINOPHIL # BLD AUTO: 0.1 K/UL (ref 0–0.5)
EOSINOPHIL NFR BLD: 1.8 % (ref 0–8)
ERYTHROCYTE [DISTWIDTH] IN BLOOD BY AUTOMATED COUNT: 15.8 % (ref 11.5–14.5)
EST. GFR  (AFRICAN AMERICAN): 27.2 ML/MIN/1.73 M^2
EST. GFR  (NON AFRICAN AMERICAN): 23.6 ML/MIN/1.73 M^2
GLUCOSE SERPL-MCNC: 99 MG/DL (ref 70–110)
GLUCOSE UR QL STRIP: NEGATIVE
HCT VFR BLD AUTO: 30.7 % (ref 37–48.5)
HGB BLD-MCNC: 9.8 G/DL (ref 12–16)
HGB UR QL STRIP: NEGATIVE
IMM GRANULOCYTES # BLD AUTO: 0.04 K/UL (ref 0–0.04)
IMM GRANULOCYTES NFR BLD AUTO: 0.5 % (ref 0–0.5)
KETONES UR QL STRIP: NEGATIVE
LEUKOCYTE ESTERASE UR QL STRIP: NEGATIVE
LYMPHOCYTES # BLD AUTO: 0.8 K/UL (ref 1–4.8)
LYMPHOCYTES NFR BLD: 10.5 % (ref 18–48)
MCH RBC QN AUTO: 32.8 PG (ref 27–31)
MCHC RBC AUTO-ENTMCNC: 31.9 G/DL (ref 32–36)
MCV RBC AUTO: 103 FL (ref 82–98)
MONOCYTES # BLD AUTO: 0.7 K/UL (ref 0.3–1)
MONOCYTES NFR BLD: 9 % (ref 4–15)
NEUTROPHILS # BLD AUTO: 5.9 K/UL (ref 1.8–7.7)
NEUTROPHILS NFR BLD: 77.7 % (ref 38–73)
NITRITE UR QL STRIP: NEGATIVE
NRBC BLD-RTO: 0 /100 WBC
PH UR STRIP: 5 [PH] (ref 5–8)
PHOSPHATE SERPL-MCNC: 3.8 MG/DL (ref 2.7–4.5)
PLATELET # BLD AUTO: 184 K/UL (ref 150–350)
PMV BLD AUTO: 10.9 FL (ref 9.2–12.9)
POTASSIUM SERPL-SCNC: 5.3 MMOL/L (ref 3.5–5.1)
PROT UR QL STRIP: NEGATIVE
PTH-INTACT SERPL-MCNC: 130.2 PG/ML (ref 9–77)
RBC # BLD AUTO: 2.99 M/UL (ref 4–5.4)
SODIUM SERPL-SCNC: 140 MMOL/L (ref 136–145)
SP GR UR STRIP: 1.01 (ref 1–1.03)
URATE SERPL-MCNC: 5.8 MG/DL (ref 2.4–5.7)
URN SPEC COLLECT METH UR: ABNORMAL
UROBILINOGEN UR STRIP-ACNC: NEGATIVE EU/DL
WBC # BLD AUTO: 7.65 K/UL (ref 3.9–12.7)

## 2020-06-24 PROCEDURE — 84550 ASSAY OF BLOOD/URIC ACID: CPT

## 2020-06-24 PROCEDURE — 83970 ASSAY OF PARATHORMONE: CPT

## 2020-06-24 PROCEDURE — 81003 URINALYSIS AUTO W/O SCOPE: CPT

## 2020-06-24 PROCEDURE — 85025 COMPLETE CBC W/AUTO DIFF WBC: CPT

## 2020-06-24 PROCEDURE — 36415 COLL VENOUS BLD VENIPUNCTURE: CPT

## 2020-06-24 PROCEDURE — 80069 RENAL FUNCTION PANEL: CPT

## 2020-06-30 ENCOUNTER — DOCUMENT SCAN (OUTPATIENT)
Dept: HOME HEALTH SERVICES | Facility: HOSPITAL | Age: 85
End: 2020-06-30
Payer: MEDICARE

## 2020-07-02 ENCOUNTER — DOCUMENT SCAN (OUTPATIENT)
Dept: HOME HEALTH SERVICES | Facility: HOSPITAL | Age: 85
End: 2020-07-02

## 2020-07-09 ENCOUNTER — DOCUMENT SCAN (OUTPATIENT)
Dept: HOME HEALTH SERVICES | Facility: HOSPITAL | Age: 85
End: 2020-07-09
Payer: MEDICARE
